# Patient Record
Sex: MALE | Race: WHITE | NOT HISPANIC OR LATINO | Employment: UNEMPLOYED | URBAN - NONMETROPOLITAN AREA
[De-identification: names, ages, dates, MRNs, and addresses within clinical notes are randomized per-mention and may not be internally consistent; named-entity substitution may affect disease eponyms.]

---

## 2018-09-25 ENCOUNTER — HISTORICAL (OUTPATIENT)
Dept: ADMINISTRATIVE | Facility: HOSPITAL | Age: 38
End: 2018-09-25

## 2023-01-06 ENCOUNTER — HISTORICAL (OUTPATIENT)
Dept: ADMINISTRATIVE | Facility: HOSPITAL | Age: 43
End: 2023-01-06

## 2023-02-24 ENCOUNTER — HOSPITAL ENCOUNTER (EMERGENCY)
Facility: HOSPITAL | Age: 43
Discharge: HOME | End: 2023-02-24
Payer: MEDICAID

## 2023-02-24 VITALS
HEIGHT: 72 IN | TEMPERATURE: 98 F | DIASTOLIC BLOOD PRESSURE: 88 MMHG | BODY MASS INDEX: 27.77 KG/M2 | SYSTOLIC BLOOD PRESSURE: 149 MMHG | OXYGEN SATURATION: 100 % | WEIGHT: 205 LBS | RESPIRATION RATE: 18 BRPM | HEART RATE: 93 BPM

## 2023-02-24 DIAGNOSIS — L03.116 CELLULITIS OF LEFT LEG: Primary | ICD-10-CM

## 2023-02-24 PROCEDURE — 96372 THER/PROPH/DIAG INJ SC/IM: CPT | Performed by: NURSE PRACTITIONER

## 2023-02-24 PROCEDURE — 99284 EMERGENCY DEPT VISIT MOD MDM: CPT

## 2023-02-24 PROCEDURE — 63600175 PHARM REV CODE 636 W HCPCS: Performed by: NURSE PRACTITIONER

## 2023-02-24 RX ORDER — MUPIROCIN 20 MG/G
OINTMENT TOPICAL 3 TIMES DAILY
Qty: 22 G | Refills: 0 | Status: SHIPPED | OUTPATIENT
Start: 2023-02-24

## 2023-02-24 RX ORDER — CEFTRIAXONE 1 G/1
1 INJECTION, POWDER, FOR SOLUTION INTRAMUSCULAR; INTRAVENOUS
Status: COMPLETED | OUTPATIENT
Start: 2023-02-24 | End: 2023-02-24

## 2023-02-24 RX ORDER — AMOXICILLIN AND CLAVULANATE POTASSIUM 875; 125 MG/1; MG/1
1 TABLET, FILM COATED ORAL 2 TIMES DAILY
Qty: 14 TABLET | Refills: 0 | Status: SHIPPED | OUTPATIENT
Start: 2023-02-24

## 2023-02-24 RX ORDER — CEPHALEXIN 500 MG/1
500 CAPSULE ORAL EVERY 6 HOURS
Qty: 28 CAPSULE | Refills: 0 | Status: SHIPPED | OUTPATIENT
Start: 2023-02-24 | End: 2023-02-24

## 2023-02-24 RX ADMIN — CEFTRIAXONE SODIUM 1 G: 1 INJECTION, POWDER, FOR SOLUTION INTRAMUSCULAR; INTRAVENOUS at 09:02

## 2023-02-25 NOTE — ED PROVIDER NOTES
Encounter Date: 2/24/2023       History     Chief Complaint   Patient presents with    Leg Pain     PT C/O LLE  PAIN AND SWELLING, DENIES INJURY, RECENT TATTOO TO REGION.      Recent tattoo on left lower leg  After getting tattoo a family pet dog was licking the site.      The history is provided by the patient. No  was used.   Review of patient's allergies indicates:   Allergen Reactions    Bactrim [sulfamethoxazole-trimethoprim]      History reviewed. No pertinent past medical history.  History reviewed. No pertinent surgical history.  History reviewed. No pertinent family history.  Social History     Tobacco Use    Smoking status: Every Day     Packs/day: 1.00     Types: Cigarettes    Smokeless tobacco: Never   Substance Use Topics    Alcohol use: Not Currently    Drug use: Not Currently     Review of Systems   Constitutional:  Negative for chills and fever.   Musculoskeletal:  Positive for myalgias.   Skin:  Positive for color change.   All other systems reviewed and are negative.    Physical Exam     Initial Vitals [02/24/23 2117]   BP Pulse Resp Temp SpO2   (!) 149/88 93 18 98.2 °F (36.8 °C) 100 %      MAP       --         Physical Exam    Nursing note and vitals reviewed.  Constitutional: He appears well-developed and well-nourished.   HENT:   Head: Normocephalic and atraumatic.   Eyes: EOM are normal. Pupils are equal, round, and reactive to light.   Cardiovascular:  Normal rate and regular rhythm.           Musculoskeletal:         General: Tenderness and edema present. Normal range of motion.      Comments: Left lower leg posterior tenderness, mild erythema distally, + mild swelling +2  Normal ROM  Healing mild flaking of tattoo, no open sores     Skin: Skin is warm and dry. Capillary refill takes less than 2 seconds. There is erythema.   Psychiatric: He has a normal mood and affect. Thought content normal.       ED Course   Procedures  Labs Reviewed - No data to display       Imaging  Results    None          Medications   cefTRIAXone injection 1 g (1 g Intramuscular Given 2/24/23 2136)     Medical Decision Making:   Initial Assessment:   cellulitis  Differential Diagnosis:   Abscess, tattoo site infection  ED Management:  Injection given here in ED tonight and patient to follow up with Primary care provider  Will return to ED if any new or worsening symptoms                        Clinical Impression:   Final diagnoses:  [L03.116] Cellulitis of left leg (Primary)        ED Disposition Condition    Discharge Stable          ED Prescriptions       Medication Sig Dispense Start Date End Date Auth. Provider    cephALEXin (KEFLEX) 500 MG capsule  (Status: Discontinued) Take 1 capsule (500 mg total) by mouth every 6 (six) hours. for 7 days 28 capsule 2/24/2023 2/24/2023 JEYSON Butler    amoxicillin-clavulanate 875-125mg (AUGMENTIN) 875-125 mg per tablet Take 1 tablet by mouth 2 (two) times daily. 14 tablet 2/24/2023 -- JEYSON Butler    mupirocin (BACTROBAN) 2 % ointment Apply topically 3 (three) times daily. 22 g 2/24/2023 -- JEYSON Butler          Follow-up Information    None          JEYSON Butler  02/24/23 2140

## 2023-05-19 ENCOUNTER — HOSPITAL ENCOUNTER (INPATIENT)
Facility: HOSPITAL | Age: 43
LOS: 3 days | Discharge: HOME | DRG: 345 | End: 2023-05-22
Admitting: FAMILY MEDICINE
Payer: MEDICAID

## 2023-05-19 DIAGNOSIS — K61.1 PERIRECTAL ABSCESS: Primary | ICD-10-CM

## 2023-05-19 LAB
ALBUMIN SERPL-MCNC: 3.9 G/DL (ref 3.4–5)
ALBUMIN/GLOB SERPL: 1.2 RATIO
ALP SERPL-CCNC: 86 UNIT/L (ref 50–144)
ALT SERPL-CCNC: 106 UNIT/L (ref 1–45)
ANION GAP SERPL CALC-SCNC: 6 MEQ/L (ref 2–13)
AST SERPL-CCNC: 53 UNIT/L (ref 17–59)
BASOPHILS # BLD AUTO: 0.03 X10(3)/MCL (ref 0.01–0.08)
BASOPHILS NFR BLD AUTO: 0.3 % (ref 0.1–1.2)
BILIRUBIN DIRECT+TOT PNL SERPL-MCNC: 0.5 MG/DL (ref 0–1)
BUN SERPL-MCNC: 13 MG/DL (ref 7–20)
CALCIUM SERPL-MCNC: 8.9 MG/DL (ref 8.4–10.2)
CHLORIDE SERPL-SCNC: 108 MMOL/L (ref 98–110)
CO2 SERPL-SCNC: 25 MMOL/L (ref 21–32)
CREAT SERPL-MCNC: 0.95 MG/DL (ref 0.66–1.25)
CREAT/UREA NIT SERPL: 14 (ref 12–20)
EOSINOPHIL # BLD AUTO: 0.2 X10(3)/MCL (ref 0.04–0.54)
EOSINOPHIL NFR BLD AUTO: 1.8 % (ref 0.7–7)
ERYTHROCYTE [DISTWIDTH] IN BLOOD BY AUTOMATED COUNT: 11.3 %
GFR SERPLBLD CREATININE-BSD FMLA CKD-EPI: >90 MLS/MIN/1.73/M2
GLOBULIN SER-MCNC: 3.2 GM/DL (ref 2–3.9)
GLUCOSE SERPL-MCNC: 108 MG/DL (ref 70–115)
HCT VFR BLD AUTO: 39.4 % (ref 36–52)
HGB BLD-MCNC: 13.8 G/DL (ref 13–18)
IMM GRANULOCYTES # BLD AUTO: 0.04 X10(3)/MCL (ref 0–0.03)
IMM GRANULOCYTES NFR BLD AUTO: 0.4 % (ref 0–0.5)
LACTATE SERPL-SCNC: 0.8 MMOL/L (ref 0.4–2)
LYMPHOCYTES # BLD AUTO: 1.18 X10(3)/MCL (ref 1.32–3.57)
LYMPHOCYTES NFR BLD AUTO: 10.9 % (ref 20–55)
MAGNESIUM SERPL-MCNC: 2 MG/DL (ref 1.8–2.4)
MCH RBC QN AUTO: 31.7 PG (ref 27–34)
MCHC RBC AUTO-ENTMCNC: 35 G/DL (ref 31–37)
MCV RBC AUTO: 90.6 FL (ref 79–99)
MONOCYTES # BLD AUTO: 1.16 X10(3)/MCL (ref 0.3–0.82)
MONOCYTES NFR BLD AUTO: 10.7 % (ref 4.7–12.5)
NEUTROPHILS # BLD AUTO: 8.26 X10(3)/MCL (ref 1.78–5.38)
NEUTROPHILS NFR BLD AUTO: 75.9 % (ref 37–73)
NRBC BLD AUTO-RTO: 0 %
PLATELET # BLD AUTO: 200 X10(3)/MCL (ref 140–371)
PMV BLD AUTO: 8.8 FL (ref 9.4–12.4)
POTASSIUM SERPL-SCNC: 3.6 MMOL/L (ref 3.5–5.1)
PROT SERPL-MCNC: 7.1 GM/DL (ref 6.3–8.2)
RBC # BLD AUTO: 4.35 X10(6)/MCL (ref 4–6)
SODIUM SERPL-SCNC: 139 MMOL/L (ref 135–145)
WBC # SPEC AUTO: 10.87 X10(3)/MCL (ref 4–11.5)

## 2023-05-19 PROCEDURE — 99285 EMERGENCY DEPT VISIT HI MDM: CPT | Mod: 25,27

## 2023-05-19 PROCEDURE — 25000003 PHARM REV CODE 250

## 2023-05-19 PROCEDURE — 87040 BLOOD CULTURE FOR BACTERIA: CPT

## 2023-05-19 PROCEDURE — 36415 COLL VENOUS BLD VENIPUNCTURE: CPT

## 2023-05-19 PROCEDURE — 83605 ASSAY OF LACTIC ACID: CPT

## 2023-05-19 PROCEDURE — 83735 ASSAY OF MAGNESIUM: CPT

## 2023-05-19 PROCEDURE — 11000001 HC ACUTE MED/SURG PRIVATE ROOM

## 2023-05-19 PROCEDURE — 63600175 PHARM REV CODE 636 W HCPCS

## 2023-05-19 PROCEDURE — 96374 THER/PROPH/DIAG INJ IV PUSH: CPT

## 2023-05-19 PROCEDURE — 85025 COMPLETE CBC W/AUTO DIFF WBC: CPT

## 2023-05-19 PROCEDURE — 96375 TX/PRO/DX INJ NEW DRUG ADDON: CPT

## 2023-05-19 PROCEDURE — 80053 COMPREHEN METABOLIC PANEL: CPT

## 2023-05-19 RX ORDER — KETOROLAC TROMETHAMINE 30 MG/ML
30 INJECTION, SOLUTION INTRAMUSCULAR; INTRAVENOUS
Status: COMPLETED | OUTPATIENT
Start: 2023-05-19 | End: 2023-05-19

## 2023-05-19 RX ADMIN — PIPERACILLIN AND TAZOBACTAM 4.5 G: 4; .5 INJECTION, POWDER, FOR SOLUTION INTRAVENOUS; PARENTERAL at 11:05

## 2023-05-19 RX ADMIN — KETOROLAC TROMETHAMINE 30 MG: 30 INJECTION, SOLUTION INTRAMUSCULAR; INTRAVENOUS at 11:05

## 2023-05-19 RX ADMIN — SODIUM CHLORIDE 1000 ML: 9 INJECTION, SOLUTION INTRAVENOUS at 11:05

## 2023-05-20 ENCOUNTER — ANESTHESIA (OUTPATIENT)
Dept: SURGERY | Facility: HOSPITAL | Age: 43
DRG: 345 | End: 2023-05-20
Payer: MEDICAID

## 2023-05-20 ENCOUNTER — ANESTHESIA EVENT (OUTPATIENT)
Dept: SURGERY | Facility: HOSPITAL | Age: 43
DRG: 345 | End: 2023-05-20
Payer: MEDICAID

## 2023-05-20 PROBLEM — R74.01 TRANSAMINITIS: Status: ACTIVE | Noted: 2023-05-20

## 2023-05-20 PROBLEM — F17.200 NICOTINE ADDICTION: Status: ACTIVE | Noted: 2023-05-20

## 2023-05-20 LAB
AMPHET UR QL SCN: ABNORMAL
BARBITURATE SCN PRESENT UR: NEGATIVE
BENZODIAZ UR QL SCN: NEGATIVE
CANNABINOIDS UR QL SCN: NEGATIVE
COCAINE UR QL SCN: NEGATIVE
INR BLD: 0.9
METHADONE UR QL SCN: NEGATIVE
OPIATES UR QL SCN: POSITIVE
PCP UR QL: NEGATIVE
PH UR: 6 [PH] (ref 3–11)
PROTHROMBIN TIME: 9.2 SECONDS (ref 9.3–11.9)

## 2023-05-20 PROCEDURE — G0378 HOSPITAL OBSERVATION PER HR: HCPCS

## 2023-05-20 PROCEDURE — 37000008 HC ANESTHESIA 1ST 15 MINUTES: Performed by: SURGERY

## 2023-05-20 PROCEDURE — 63600175 PHARM REV CODE 636 W HCPCS: Performed by: FAMILY MEDICINE

## 2023-05-20 PROCEDURE — 87205 SMEAR GRAM STAIN: CPT | Performed by: SURGERY

## 2023-05-20 PROCEDURE — 80307 DRUG TEST PRSMV CHEM ANLYZR: CPT

## 2023-05-20 PROCEDURE — 25500020 PHARM REV CODE 255: Performed by: FAMILY MEDICINE

## 2023-05-20 PROCEDURE — 63600175 PHARM REV CODE 636 W HCPCS: Performed by: NURSE ANESTHETIST, CERTIFIED REGISTERED

## 2023-05-20 PROCEDURE — 36000705 HC OR TIME LEV I EA ADD 15 MIN: Performed by: SURGERY

## 2023-05-20 PROCEDURE — D9220A PRA ANESTHESIA: Mod: ,,, | Performed by: NURSE ANESTHETIST, CERTIFIED REGISTERED

## 2023-05-20 PROCEDURE — 25000003 PHARM REV CODE 250: Performed by: FAMILY MEDICINE

## 2023-05-20 PROCEDURE — 63600175 PHARM REV CODE 636 W HCPCS: Performed by: INTERNAL MEDICINE

## 2023-05-20 PROCEDURE — 87075 CULTR BACTERIA EXCEPT BLOOD: CPT | Performed by: SURGERY

## 2023-05-20 PROCEDURE — 87070 CULTURE OTHR SPECIMN AEROBIC: CPT | Performed by: SURGERY

## 2023-05-20 PROCEDURE — 85610 PROTHROMBIN TIME: CPT | Performed by: INTERNAL MEDICINE

## 2023-05-20 PROCEDURE — 36415 COLL VENOUS BLD VENIPUNCTURE: CPT | Performed by: INTERNAL MEDICINE

## 2023-05-20 PROCEDURE — 63600175 PHARM REV CODE 636 W HCPCS

## 2023-05-20 PROCEDURE — D9220A PRA ANESTHESIA: ICD-10-PCS | Mod: ,,, | Performed by: NURSE ANESTHETIST, CERTIFIED REGISTERED

## 2023-05-20 PROCEDURE — 25000003 PHARM REV CODE 250

## 2023-05-20 PROCEDURE — 99900035 HC TECH TIME PER 15 MIN (STAT)

## 2023-05-20 PROCEDURE — 71000033 HC RECOVERY, INTIAL HOUR: Performed by: SURGERY

## 2023-05-20 PROCEDURE — 11000001 HC ACUTE MED/SURG PRIVATE ROOM

## 2023-05-20 PROCEDURE — 25000003 PHARM REV CODE 250: Performed by: NURSE ANESTHETIST, CERTIFIED REGISTERED

## 2023-05-20 PROCEDURE — 36000704 HC OR TIME LEV I 1ST 15 MIN: Performed by: SURGERY

## 2023-05-20 PROCEDURE — 37000009 HC ANESTHESIA EA ADD 15 MINS: Performed by: SURGERY

## 2023-05-20 PROCEDURE — 25000003 PHARM REV CODE 250: Performed by: INTERNAL MEDICINE

## 2023-05-20 PROCEDURE — 80074 ACUTE HEPATITIS PANEL: CPT | Performed by: INTERNAL MEDICINE

## 2023-05-20 PROCEDURE — 94761 N-INVAS EAR/PLS OXIMETRY MLT: CPT

## 2023-05-20 PROCEDURE — 25000003 PHARM REV CODE 250: Performed by: SURGERY

## 2023-05-20 PROCEDURE — S0030 INJECTION, METRONIDAZOLE: HCPCS | Performed by: INTERNAL MEDICINE

## 2023-05-20 PROCEDURE — S4991 NICOTINE PATCH NONLEGEND: HCPCS | Performed by: FAMILY MEDICINE

## 2023-05-20 RX ORDER — PROPOFOL 10 MG/ML
VIAL (ML) INTRAVENOUS
Status: DISCONTINUED | OUTPATIENT
Start: 2023-05-20 | End: 2023-05-20

## 2023-05-20 RX ORDER — METRONIDAZOLE 500 MG/100ML
500 INJECTION, SOLUTION INTRAVENOUS
Status: DISCONTINUED | OUTPATIENT
Start: 2023-05-20 | End: 2023-05-22 | Stop reason: HOSPADM

## 2023-05-20 RX ORDER — ONDANSETRON 2 MG/ML
4 INJECTION INTRAMUSCULAR; INTRAVENOUS EVERY 6 HOURS PRN
Status: DISCONTINUED | OUTPATIENT
Start: 2023-05-20 | End: 2023-05-20

## 2023-05-20 RX ORDER — ACETAMINOPHEN 325 MG/1
650 TABLET ORAL EVERY 6 HOURS PRN
Status: DISCONTINUED | OUTPATIENT
Start: 2023-05-20 | End: 2023-05-22 | Stop reason: HOSPADM

## 2023-05-20 RX ORDER — HYDROMORPHONE HYDROCHLORIDE 1 MG/ML
0.5 INJECTION, SOLUTION INTRAMUSCULAR; INTRAVENOUS; SUBCUTANEOUS EVERY 4 HOURS PRN
Status: DISCONTINUED | OUTPATIENT
Start: 2023-05-20 | End: 2023-05-20

## 2023-05-20 RX ORDER — SODIUM CHLORIDE 0.9 % (FLUSH) 0.9 %
10 SYRINGE (ML) INJECTION
Status: DISCONTINUED | OUTPATIENT
Start: 2023-05-20 | End: 2023-05-22 | Stop reason: HOSPADM

## 2023-05-20 RX ORDER — SODIUM CHLORIDE 9 MG/ML
INJECTION, SOLUTION INTRAVENOUS CONTINUOUS
Status: DISCONTINUED | OUTPATIENT
Start: 2023-05-20 | End: 2023-05-21

## 2023-05-20 RX ORDER — HYDROMORPHONE HYDROCHLORIDE 1 MG/ML
1 INJECTION, SOLUTION INTRAMUSCULAR; INTRAVENOUS; SUBCUTANEOUS EVERY 4 HOURS PRN
Status: DISCONTINUED | OUTPATIENT
Start: 2023-05-20 | End: 2023-05-20

## 2023-05-20 RX ORDER — HYDROMORPHONE HYDROCHLORIDE 1 MG/ML
0.5 INJECTION, SOLUTION INTRAMUSCULAR; INTRAVENOUS; SUBCUTANEOUS EVERY 6 HOURS PRN
Status: DISCONTINUED | OUTPATIENT
Start: 2023-05-20 | End: 2023-05-21 | Stop reason: SDUPTHER

## 2023-05-20 RX ORDER — MORPHINE SULFATE 2 MG/ML
2 INJECTION, SOLUTION INTRAMUSCULAR; INTRAVENOUS EVERY 6 HOURS PRN
Status: DISCONTINUED | OUTPATIENT
Start: 2023-05-20 | End: 2023-05-22 | Stop reason: HOSPADM

## 2023-05-20 RX ORDER — DEXTROSE MONOHYDRATE AND SODIUM CHLORIDE 5; .9 G/100ML; G/100ML
INJECTION, SOLUTION INTRAVENOUS CONTINUOUS
Status: DISCONTINUED | OUTPATIENT
Start: 2023-05-20 | End: 2023-05-20

## 2023-05-20 RX ORDER — DEXTROSE MONOHYDRATE AND SODIUM CHLORIDE 5; .9 G/100ML; G/100ML
INJECTION, SOLUTION INTRAVENOUS CONTINUOUS
Status: DISCONTINUED | OUTPATIENT
Start: 2023-05-20 | End: 2023-05-20 | Stop reason: SDUPTHER

## 2023-05-20 RX ORDER — HYDROCODONE BITARTRATE AND ACETAMINOPHEN 5; 325 MG/1; MG/1
1 TABLET ORAL EVERY 6 HOURS PRN
Status: DISCONTINUED | OUTPATIENT
Start: 2023-05-20 | End: 2023-05-20 | Stop reason: SDUPTHER

## 2023-05-20 RX ORDER — HYDROCODONE BITARTRATE AND ACETAMINOPHEN 5; 325 MG/1; MG/1
1 TABLET ORAL EVERY 4 HOURS PRN
Status: DISCONTINUED | OUTPATIENT
Start: 2023-05-20 | End: 2023-05-22 | Stop reason: HOSPADM

## 2023-05-20 RX ORDER — ONDANSETRON 2 MG/ML
4 INJECTION INTRAMUSCULAR; INTRAVENOUS EVERY 8 HOURS PRN
Status: DISCONTINUED | OUTPATIENT
Start: 2023-05-20 | End: 2023-05-20 | Stop reason: SDUPTHER

## 2023-05-20 RX ORDER — FENTANYL CITRATE 50 UG/ML
INJECTION, SOLUTION INTRAMUSCULAR; INTRAVENOUS
Status: DISCONTINUED | OUTPATIENT
Start: 2023-05-20 | End: 2023-05-20

## 2023-05-20 RX ORDER — ONDANSETRON 2 MG/ML
4 INJECTION INTRAMUSCULAR; INTRAVENOUS EVERY 12 HOURS PRN
Status: DISCONTINUED | OUTPATIENT
Start: 2023-05-20 | End: 2023-05-22 | Stop reason: HOSPADM

## 2023-05-20 RX ORDER — IBUPROFEN 200 MG
1 TABLET ORAL DAILY
Status: DISCONTINUED | OUTPATIENT
Start: 2023-05-20 | End: 2023-05-22 | Stop reason: HOSPADM

## 2023-05-20 RX ORDER — BUPIVACAINE HYDROCHLORIDE 5 MG/ML
INJECTION, SOLUTION EPIDURAL; INTRACAUDAL
Status: DISCONTINUED | OUTPATIENT
Start: 2023-05-20 | End: 2023-05-20 | Stop reason: HOSPADM

## 2023-05-20 RX ORDER — MUPIROCIN 20 MG/G
1 OINTMENT TOPICAL 2 TIMES DAILY
Status: DISCONTINUED | OUTPATIENT
Start: 2023-05-20 | End: 2023-05-22 | Stop reason: HOSPADM

## 2023-05-20 RX ORDER — KETOROLAC TROMETHAMINE 30 MG/ML
INJECTION, SOLUTION INTRAMUSCULAR; INTRAVENOUS
Status: DISCONTINUED | OUTPATIENT
Start: 2023-05-20 | End: 2023-05-20

## 2023-05-20 RX ORDER — ACETAMINOPHEN 325 MG/1
650 TABLET ORAL EVERY 6 HOURS PRN
Status: DISCONTINUED | OUTPATIENT
Start: 2023-05-20 | End: 2023-05-20

## 2023-05-20 RX ORDER — GLYCOPYRROLATE 0.2 MG/ML
INJECTION INTRAMUSCULAR; INTRAVENOUS
Status: DISCONTINUED | OUTPATIENT
Start: 2023-05-20 | End: 2023-05-20

## 2023-05-20 RX ORDER — MIDAZOLAM HYDROCHLORIDE 1 MG/ML
INJECTION INTRAMUSCULAR; INTRAVENOUS
Status: DISCONTINUED | OUTPATIENT
Start: 2023-05-20 | End: 2023-05-20

## 2023-05-20 RX ORDER — LIDOCAINE HYDROCHLORIDE 10 MG/ML
INJECTION, SOLUTION EPIDURAL; INFILTRATION; INTRACAUDAL; PERINEURAL
Status: DISCONTINUED | OUTPATIENT
Start: 2023-05-20 | End: 2023-05-20 | Stop reason: HOSPADM

## 2023-05-20 RX ADMIN — DEXTROSE AND SODIUM CHLORIDE: 5; 900 INJECTION, SOLUTION INTRAVENOUS at 02:05

## 2023-05-20 RX ADMIN — DEXTROSE AND SODIUM CHLORIDE: 5; 900 INJECTION, SOLUTION INTRAVENOUS at 06:05

## 2023-05-20 RX ADMIN — CEFEPIME 1 G: 1 INJECTION, POWDER, FOR SOLUTION INTRAMUSCULAR; INTRAVENOUS at 03:05

## 2023-05-20 RX ADMIN — NICOTINE 1 PATCH: 21 PATCH, EXTENDED RELEASE TRANSDERMAL at 03:05

## 2023-05-20 RX ADMIN — IOPAMIDOL 100 ML: 755 INJECTION, SOLUTION INTRAVENOUS at 03:05

## 2023-05-20 RX ADMIN — FENTANYL CITRATE 100 MCG: 50 INJECTION, SOLUTION INTRAMUSCULAR; INTRAVENOUS at 10:05

## 2023-05-20 RX ADMIN — VANCOMYCIN HYDROCHLORIDE 2000 MG: 1 INJECTION, POWDER, LYOPHILIZED, FOR SOLUTION INTRAVENOUS at 12:05

## 2023-05-20 RX ADMIN — SODIUM CHLORIDE: 9 INJECTION, SOLUTION INTRAVENOUS at 11:05

## 2023-05-20 RX ADMIN — FENTANYL CITRATE 50 MCG: 50 INJECTION, SOLUTION INTRAMUSCULAR; INTRAVENOUS at 10:05

## 2023-05-20 RX ADMIN — PROPOFOL 100 MG: 10 INJECTION, EMULSION INTRAVENOUS at 10:05

## 2023-05-20 RX ADMIN — GLYCOPYRROLATE 0.2 MG: 0.2 INJECTION INTRAMUSCULAR; INTRAVENOUS at 10:05

## 2023-05-20 RX ADMIN — CEFEPIME 1 G: 1 INJECTION, POWDER, FOR SOLUTION INTRAMUSCULAR; INTRAVENOUS at 06:05

## 2023-05-20 RX ADMIN — KETOROLAC TROMETHAMINE 30 MG: 30 INJECTION, SOLUTION INTRAMUSCULAR; INTRAVENOUS at 10:05

## 2023-05-20 RX ADMIN — DEXTROSE AND SODIUM CHLORIDE: 5; 900 INJECTION, SOLUTION INTRAVENOUS at 04:05

## 2023-05-20 RX ADMIN — MUPIROCIN 1 G: 20 OINTMENT TOPICAL at 11:05

## 2023-05-20 RX ADMIN — MIDAZOLAM HYDROCHLORIDE 2 MG: 1 INJECTION, SOLUTION INTRAMUSCULAR; INTRAVENOUS at 10:05

## 2023-05-20 RX ADMIN — CEFEPIME 1 G: 1 INJECTION, POWDER, FOR SOLUTION INTRAMUSCULAR; INTRAVENOUS at 10:05

## 2023-05-20 RX ADMIN — VANCOMYCIN HYDROCHLORIDE 1750 MG: 1 INJECTION, POWDER, LYOPHILIZED, FOR SOLUTION INTRAVENOUS at 12:05

## 2023-05-20 RX ADMIN — METRONIDAZOLE 500 MG: 5 INJECTION, SOLUTION INTRAVENOUS at 10:05

## 2023-05-20 RX ADMIN — METRONIDAZOLE 500 MG: 5 INJECTION, SOLUTION INTRAVENOUS at 06:05

## 2023-05-20 RX ADMIN — METRONIDAZOLE 500 MG: 5 INJECTION, SOLUTION INTRAVENOUS at 03:05

## 2023-05-20 NOTE — ASSESSMENT & PLAN NOTE
Dangers of cigarette smoking were reviewed with patient in detail. Patient was Counseled for 3-10 minutes. Nicotine replacement options were discussed. Nicotine replacement was discussed- prescribed

## 2023-05-20 NOTE — ED PROVIDER NOTES
Encounter Date: 5/19/2023       History     Chief Complaint   Patient presents with    Abscess     On R buttock. States they wanted to admit him him earlier today but had to care care of things at home first.     42-year-old male returns to the emergency room for admission.  He was offered admission earlier today for a perirectal abscess.  He declined admission, and returns at this time.  He has painful swelling in his perineum, into the right medial buttock for the past 3 days.  Denies any fever or chills.  He is suffered with cellulitis in the past.  He reports substance abuse, using both methamphetamines and heroin.  He is not a diabetic.    The history is provided by the patient.   Review of patient's allergies indicates:   Allergen Reactions    Bactrim [sulfamethoxazole-trimethoprim]      No past medical history on file.  Past Surgical History:   Procedure Laterality Date    LEG SURGERY Right      No family history on file.  Social History     Tobacco Use    Smoking status: Every Day     Packs/day: 1.00     Types: Cigarettes    Smokeless tobacco: Never   Substance Use Topics    Alcohol use: Not Currently    Drug use: Not Currently     Review of Systems   Skin:         Swelling and pain in the perineum and right buttock.     Physical Exam     Initial Vitals [05/19/23 2302]   BP Pulse Resp Temp SpO2   131/79 93 16 -- 98 %      MAP       --         Physical Exam    Nursing note and vitals reviewed.  Constitutional: Vital signs are normal. He appears well-developed and well-nourished. He is cooperative.   HENT:   Head: Normocephalic and atraumatic.   Eyes: Conjunctivae, EOM and lids are normal. Pupils are equal, round, and reactive to light.   Neck: Trachea normal. Neck supple.   Normal range of motion.  Cardiovascular:  Normal rate, regular rhythm, normal heart sounds and intact distal pulses.           Pulmonary/Chest: Breath sounds normal.   Abdominal: Abdomen is soft. Bowel sounds are normal.   Musculoskeletal:          General: Normal range of motion.      Cervical back: Normal, normal range of motion and neck supple.      Lumbar back: Normal.     Neurological: He is alert and oriented to person, place, and time. He has normal strength. Coordination normal.   Skin: Skin is warm, dry and intact. Capillary refill takes less than 2 seconds. Abscess noted. There is erythema.   There is a perirectal abscess noted on the medial right buttock, extending into the perineum.  It is quite indurated and tender, with no fluctuance.   Psychiatric: He has a normal mood and affect. His speech is normal and behavior is normal. Judgment and thought content normal. Cognition and memory are normal.       ED Course   Procedures  Labs Reviewed   CBC WITH DIFFERENTIAL - Abnormal; Notable for the following components:       Result Value    MPV 8.8 (*)     Neut % 75.9 (*)     Lymph % 10.9 (*)     Lymph # 1.18 (*)     Neut # 8.26 (*)     Mono # 1.16 (*)     IG# 0.04 (*)     All other components within normal limits   BLOOD CULTURE [facility]   BLOOD CULTURE [facility]   CBC W/ AUTO DIFFERENTIAL    Narrative:     The following orders were created for panel order CBC auto differential.  Procedure                               Abnormality         Status                     ---------                               -----------         ------                     CBC with Differential[[id number]]        Abnormal            Final result                 Please view results for these tests on the individual orders.   COMPREHENSIVE METABOLIC PANEL   LACTIC ACID, PLASMA   MAGNESIUM   DRUG SCREEN, URINE (BEAKER)          Imaging Results    None          Medications   sodium chloride 0.9% bolus 1,000 mL 1,000 mL (1,000 mLs Intravenous New Bag 5/19/23 8610)   vancomycin - pharmacy to dose (has no administration in time range)   vancomycin (VANCOCIN) 2,000 mg in dextrose 5 % (D5W) 500 mL IVPB (has no administration in time range)   piperacillin-tazobactam (ZOSYN) 4.5 g in  dextrose 5 % in water (D5W) 5 % 100 mL IVPB (MB+) (4.5 g Intravenous New Bag 5/19/23 2321)   ketorolac injection 30 mg (30 mg Intravenous Given 5/19/23 2321)     Medical Decision Making:   Initial Assessment:   Perirectal abscess  ED Management:  Zosyn, vancomycin  Rule out sepsis  Admit for surgical evaluation in the morning  Patient does not meet sepsis criteria.                        Clinical Impression:   Final diagnoses:  [K61.1] Perirectal abscess (Primary)        ED Disposition Condition    Observation Good                Hugo Corea MD  05/19/23 4565

## 2023-05-20 NOTE — HOSPITAL COURSE
05/20/2023 pt admitted with large jeanette rectal abscess, awaiting surgery consult this am.  05/21/2023 patient is status post I&D of perirectal abscess.  On IV antibiotics.  We will have dressing change today.  05/22/2023 brief discharge summary:  Young patient presented with perirectal pain in the emergency room was found to have large perirectal abscess.  Hospital course patient was admitted to the hospital given IV antibiotics patient underwent I and D with Dr. Medrano.  He is now getting dressing changes he is afebrile stable can be discharged home.  We will send him home on Cleocin 300 mg 4 times a day for 7 days.  He will follow with Dr. Medrano.   is arranging for wound care to be done here on [address] on a daily basis.

## 2023-05-20 NOTE — HPI
Patient is a 42 y.o. male with a medical history of nicotine addiction and polysubstance abuse including methamphetamine and heroin presents to the ER on account of rectal pain, swelling and redness over the past 3 days.    Patient has been at his usual state of health until about 3 days ago when he developed rectal pain, pain has been persistent, about 8/10 in intensity, radiating to involve the whole of the rectal area, no aggravating or relieving factor.  This has been associated with swelling and redness.  There was also yellowish discharge.  He also complains of fever.  He decided to come to the ER earlier yesterday for which she was evaluated and diagnosis of likely perirectal abscess was made and patient was supposed to undergo drainage.  In left the ER saying that he would come back.  He later came back last night for further evaluation.  Plan was to consult general surgeon for the purpose of incision and drainage later this morning as per my discussion with the ER attending.

## 2023-05-20 NOTE — PROGRESS NOTES
[facility]/Surg  [facility] Medicine  Progress Note    Patient Name: Omar Rodriguez  MRN: [id number]  Patient Class: OP- Observation   Admission Date: 5/19/2023  Length of Stay: 0 days  Attending Physician: Denise Rodriguez MD  Primary Care Provider: Primary Doctor No        Subjective:     Principal Problem:Perirectal abscess        HPI:  Patient is a 42 y.o. male with a medical history of nicotine addiction and polysubstance abuse including methamphetamine and heroin presents to the ER on account of rectal pain, swelling and redness over the past 3 days.    Patient has been at his usual state of health until about 3 days ago when he developed rectal pain, pain has been persistent, about 8/10 in intensity, radiating to involve the whole of the rectal area, no aggravating or relieving factor.  This has been associated with swelling and redness.  There was also yellowish discharge.  He also complains of fever.  He decided to come to the ER earlier yesterday for which she was evaluated and diagnosis of likely perirectal abscess was made and patient was supposed to undergo drainage.  In left the ER saying that he would come back.  He later came back last night for further evaluation.  Plan was to consult general surgeon for the purpose of incision and drainage later this morning as per my discussion with the ER attending.      Overview/Hospital Course:  05/20/2023 pt admitted with large jeanette rectal abscess, awaiting surgery consult this am.      Interval History:       Review of Systems   Constitutional:  Negative for appetite change, fatigue and fever.   Respiratory:  Negative for cough, shortness of breath and wheezing.    Cardiovascular:  Negative for chest pain and leg swelling.   Gastrointestinal:  Negative for abdominal distention, abdominal pain, constipation, diarrhea, nausea and vomiting.   Skin:  Positive for rash and wound. Negative for color change and pallor.   Neurological:  Negative for  tremors, syncope and headaches.   Psychiatric/Behavioral:  Negative for agitation and behavioral problems.    Objective:     Vital Signs (Most Recent):  Temp: 98 °F (36.7 °C) (05/20/23 1029)  Pulse: 75 (05/20/23 1029)  Resp: 20 (05/20/23 1029)  BP: 116/82 (05/20/23 1029)  SpO2: 98 % (05/20/23 1029) Vital Signs (24h Range):  Temp:  [97.2 °F (36.2 °C)-98 °F (36.7 °C)] 98 °F (36.7 °C)  Pulse:  [75-93] 75  Resp:  [16-20] 20  SpO2:  [97 %-100 %] 98 %  BP: (114-131)/(79-89) 116/82     Weight: 97.7 kg (215 lb 4.8 oz)  Body mass index is 29.2 kg/m².    Intake/Output Summary (Last 24 hours) at 5/20/2023 1413  Last data filed at 5/20/2023 1330  Gross per 24 hour   Intake 761.25 ml   Output 1050 ml   Net -288.75 ml         Physical Exam  Constitutional:       General: He is not in acute distress.     Appearance: Normal appearance. He is not ill-appearing, toxic-appearing or diaphoretic.   HENT:      Head: Normocephalic and atraumatic.      Nose: Nose normal. No congestion or rhinorrhea.      Mouth/Throat:      Mouth: Mucous membranes are moist.      Pharynx: Oropharynx is clear.   Eyes:      General: No scleral icterus.     Conjunctiva/sclera: Conjunctivae normal.   Neck:      Vascular: No carotid bruit.   Cardiovascular:      Rate and Rhythm: Normal rate and regular rhythm.      Pulses: Normal pulses.      Heart sounds: Normal heart sounds. No murmur heard.  Pulmonary:      Effort: Pulmonary effort is normal. No respiratory distress.      Breath sounds: Normal breath sounds. No wheezing, rhonchi or rales.   Abdominal:      General: Abdomen is flat. Bowel sounds are normal. There is no distension.      Palpations: Abdomen is soft.      Tenderness: There is no abdominal tenderness. There is no guarding.   Musculoskeletal:         General: No swelling or tenderness. Normal range of motion.      Cervical back: Normal range of motion and neck supple. No tenderness.      Right lower leg: No edema.      Left lower leg: No edema.    Lymphadenopathy:      Cervical: No cervical adenopathy.   Skin:     General: Skin is warm and dry.      Coloration: Skin is not jaundiced or pale.      Findings: Lesion and rash present.      Comments: Large boil on right buttocks near rectum, very red and tender, swollen, surrounding cellulitis   Neurological:      General: No focal deficit present.      Mental Status: He is alert and oriented to person, place, and time. Mental status is at baseline.      Cranial Nerves: No cranial nerve deficit.      Motor: No weakness.      Coordination: Coordination normal.      Gait: Gait normal.   Psychiatric:         Mood and Affect: Mood normal.         Behavior: Behavior normal.         Thought Content: Thought content normal.         Judgment: Judgment normal.           Significant Labs: All pertinent labs within the past 24 hours have been reviewed.  CBC:   Recent Labs   Lab 05/19/23  2329   WBC 10.87   HGB 13.8   HCT 39.4        CMP:   Recent Labs   Lab 05/19/23 2329      K 3.6   CO2 25   BUN 13.0   CREATININE 0.95   CALCIUM 8.9   ALBUMIN 3.9   BILITOT 0.5   ALKPHOS 86   AST 53   *       Significant Imaging: I have reviewed all pertinent imaging results/findings within the past 24 hours.      Assessment/Plan:      * Perirectal abscess  Continue iv abx  Surgery consulted      Transaminitis  High LFTS  Hepatitis panel sent      Nicotine addiction  Dangers of cigarette smoking were reviewed with patient in detail. Patient was Counseled for 3-10 minutes. Nicotine replacement options were discussed. Nicotine replacement was discussed- prescribed      VTE Risk Mitigation (From admission, onward)         Ordered     IP VTE LOW RISK PATIENT  Once         05/20/23 0333     Place sequential compression device  Until discontinued         05/20/23 0333                Discharge Planning   AMANDEEP: 5/22/2023     Code Status: Full Code   Is the patient medically ready for discharge?:     Reason for patient still in  hospital (select all that apply): Patient trending condition, Treatment and Consult recommendations                     Denise Rodriguez MD  Department of Hospital Medicine   [facility]

## 2023-05-20 NOTE — SUBJECTIVE & OBJECTIVE
Interval History:       Review of Systems   Constitutional:  Negative for appetite change, fatigue and fever.   Respiratory:  Negative for cough, shortness of breath and wheezing.    Cardiovascular:  Negative for chest pain and leg swelling.   Gastrointestinal:  Negative for abdominal distention, abdominal pain, constipation, diarrhea, nausea and vomiting.   Skin:  Positive for rash and wound. Negative for color change and pallor.   Neurological:  Negative for tremors, syncope and headaches.   Psychiatric/Behavioral:  Negative for agitation and behavioral problems.    Objective:     Vital Signs (Most Recent):  Temp: 98 °F (36.7 °C) (05/20/23 1029)  Pulse: 75 (05/20/23 1029)  Resp: 20 (05/20/23 1029)  BP: 116/82 (05/20/23 1029)  SpO2: 98 % (05/20/23 1029) Vital Signs (24h Range):  Temp:  [97.2 °F (36.2 °C)-98 °F (36.7 °C)] 98 °F (36.7 °C)  Pulse:  [75-93] 75  Resp:  [16-20] 20  SpO2:  [97 %-100 %] 98 %  BP: (114-131)/(79-89) 116/82     Weight: 97.7 kg (215 lb 4.8 oz)  Body mass index is 29.2 kg/m².    Intake/Output Summary (Last 24 hours) at 5/20/2023 1413  Last data filed at 5/20/2023 1330  Gross per 24 hour   Intake 761.25 ml   Output 1050 ml   Net -288.75 ml         Physical Exam  Constitutional:       General: He is not in acute distress.     Appearance: Normal appearance. He is not ill-appearing, toxic-appearing or diaphoretic.   HENT:      Head: Normocephalic and atraumatic.      Nose: Nose normal. No congestion or rhinorrhea.      Mouth/Throat:      Mouth: Mucous membranes are moist.      Pharynx: Oropharynx is clear.   Eyes:      General: No scleral icterus.     Conjunctiva/sclera: Conjunctivae normal.   Neck:      Vascular: No carotid bruit.   Cardiovascular:      Rate and Rhythm: Normal rate and regular rhythm.      Pulses: Normal pulses.      Heart sounds: Normal heart sounds. No murmur heard.  Pulmonary:      Effort: Pulmonary effort is normal. No respiratory distress.      Breath sounds: Normal breath  sounds. No wheezing, rhonchi or rales.   Abdominal:      General: Abdomen is flat. Bowel sounds are normal. There is no distension.      Palpations: Abdomen is soft.      Tenderness: There is no abdominal tenderness. There is no guarding.   Musculoskeletal:         General: No swelling or tenderness. Normal range of motion.      Cervical back: Normal range of motion and neck supple. No tenderness.      Right lower leg: No edema.      Left lower leg: No edema.   Lymphadenopathy:      Cervical: No cervical adenopathy.   Skin:     General: Skin is warm and dry.      Coloration: Skin is not jaundiced or pale.      Findings: Lesion and rash present.      Comments: Large boil on right buttocks near rectum, very red and tender, swollen, surrounding cellulitis   Neurological:      General: No focal deficit present.      Mental Status: He is alert and oriented to person, place, and time. Mental status is at baseline.      Cranial Nerves: No cranial nerve deficit.      Motor: No weakness.      Coordination: Coordination normal.      Gait: Gait normal.   Psychiatric:         Mood and Affect: Mood normal.         Behavior: Behavior normal.         Thought Content: Thought content normal.         Judgment: Judgment normal.           Significant Labs: All pertinent labs within the past 24 hours have been reviewed.  CBC:   Recent Labs   Lab 05/19/23  2329   WBC 10.87   HGB 13.8   HCT 39.4        CMP:   Recent Labs   Lab 05/19/23  2329      K 3.6   CO2 25   BUN 13.0   CREATININE 0.95   CALCIUM 8.9   ALBUMIN 3.9   BILITOT 0.5   ALKPHOS 86   AST 53   *       Significant Imaging: I have reviewed all pertinent imaging results/findings within the past 24 hours.

## 2023-05-20 NOTE — H&P
Chief Complaint; Rectal pain, swelling and redness over the past 3 days    HPI:   Patient is a 42 y.o. male with a medical history of nicotine addiction and polysubstance abuse including methamphetamine and heroin presents to the ER on account of rectal pain, swelling and redness over the past 3 days.    Patient has been at his usual state of health until about 3 days ago when he developed rectal pain, pain has been persistent, about 8/10 in intensity, radiating to involve the whole of the rectal area, no aggravating or relieving factor.  This has been associated with swelling and redness.  There was also yellowish discharge.  He also complains of fever.  He decided to come to the ER earlier yesterday for which she was evaluated and diagnosis of likely perirectal abscess was made and patient was supposed to undergo drainage.  In left the ER saying that he would come back.  He later came back last night for further evaluation.  Plan was to consult general surgeon for the purpose of incision and drainage later this morning as per my discussion with the ER attending.    PCP Primary Doctor No MD        No past medical history on file.  Denies history of hypertension or diabetes.    Past Surgical History:   Procedure Laterality Date    LEG SURGERY Right         (Not in a hospital admission)  Not on any home medications    Review of patient's allergies indicates:   Allergen Reactions    Bactrim [sulfamethoxazole-trimethoprim]         Social History     Tobacco Use    Smoking status: Every Day     Packs/day: 1.00     Types: Cigarettes    Smokeless tobacco: Never   Substance Use Topics    Alcohol use: Not Currently    Admits to history of cigarette smoking, methamphetamine and heroin use.  Denies history of alcohol abuse.    No family history on file.  Significant family history of hypertension    Review of Systems  Review of Systems   Constitutional: Negative for fever.   HEENT: Negative for drooling, ear pain, facial  swelling and nosebleeds.    Eyes: Negative for discharge and visual disturbance.   Respiratory: Negative for cough, negative shortness of breath.    Cardiovascular: negative for chest pain or SOB  Gastrointestinal: Negative for anal bleeding and rectal pain. Negative Nausea or Vomiting.  Genitourinary; positive for rectal pain, swelling and redness and discharge   Musculoskeletal: Negative for neck pain.   Skin: Negative for rash.   Neurological: negative for numbness, negative for weakness,negative for seizures and facial asymmetry.              Objective:     No intake/output data recorded.    /79   Pulse 93   Resp 16   Ht 6' (1.829 m)   Wt 97.5 kg (215 lb)   SpO2 98%   BMI 29.16 kg/m²     General Appearance:    Awake, alert, not in acute distress   HEENT:   atraumatic, PERRL, EOM intact, conjuctiva pink, sclera anicteric, oropharynx moist, no lesions noted   Neck:    Supple, no JVD, no carotid bruits, no lymphadenopathy or thyromegaly noted       Pulmonary:   Clear to auscultation bilaterally, reduced breath sounds bileterally.   Cardiovascular:    Regular rate and rhythm, S1 S2 normal   Abdomen:     Soft, non-tender,nondistended, bowel sounds active all four quadrants, no masses, no organomegaly   Extremities:  no edema, no cyanosis or clubbing noted       Skin:   No bruises noted.    RECTAL; obvious redness of the rectal area, swelling, tenderness, with yellowish discharge.       Neurologic: Alert, awake, oriented x 3, moves all extremities.                 Data Review :   Labs:    CBC:   Lab Results   Component Value Date    WBC 10.87 05/19/2023    RBC 4.35 05/19/2023    HGB 13.8 05/19/2023    HCT 39.4 05/19/2023     05/19/2023     CMP:   Lab Results   Component Value Date     05/19/2023    K 3.6 05/19/2023    CO2 25 05/19/2023    BUN 13.0 05/19/2023    CREATININE 0.95 05/19/2023    CALCIUM 8.9 05/19/2023    ALKPHOS 86 05/19/2023    AST 53 05/19/2023     (H) 05/19/2023     ALBUMIN 3.9 05/19/2023    BILITOT 0.5 05/19/2023     Cardiac markers: No results found for: BNP, CKMB, CKTOTAL, TROPONIN, MYOGLOBIN    Radiology:  Micro:  No components found for: BLOODCX, SPUTUMCX, URINECX    Radiology:  [unfilled]        Assessment & Plan:   1. Likely perirectal abscess; unclear precipitating factor, we will obtain a CT of the pelvis area for further evaluation.  Start patient on IV cefepime, Flagyl and continue IV vancomycin.  Follow up the culture results.  General surgeon consulted for the purpose of incision and drainage in the morning as per my discussion with the ER attending.  Continue analgesics.    2. Nicotine addiction with polysubstance abuse; including methamphetamine heroin, patient has been counseled on need to quit cigarette smoking and to avoid illicit drug use.  He promised to quit nicotine addiction and illicit drug use.    3. Liver dysfunction; ALT was found to be significantly elevated, we will obtain acute hepatitis panel.  Avoid hepatotoxic agents.    4. Maintain the patient on DVT prophylaxis by way of SCD    Disposition; for incision and drainage in the morning.  Patient is kept NPO at this time.  This note was completed using voice recognition software and transcription errors may occur.    This Encounter was via Telemedicine (Both audio and visual ) and from [place], [place].  Patient was located in [place].    Evaluation  of the patient was done alongside the patient's nursing staff     Patient will be placed on observation status.    Chelo Rivers  5/20/2023  2:16 AM

## 2023-05-20 NOTE — PROGRESS NOTES
Pharmacokinetic Initial Assessment: IV Vancomycin    Assessment/Plan:    Initiate intravenous vancomycin with loading dose of 2000 mg once followed by a maintenance dose of vancomycin 1750mg IV every 12 hours  Desired empiric serum trough concentration is 10 to 15 mcg/mL  Draw vancomycin trough level 60 min prior to fourth dose on 5/21 at approximately 1130  Pharmacy will continue to follow and monitor vancomycin.      Please contact pharmacy with any questions regarding this assessment.     Thank you for the consult,   Denise Polk       Patient brief summary:  Omar Rodriguez is a 42 y.o. male initiated on antimicrobial therapy with IV Vancomycin for treatment of suspected skin & soft tissue infection    Drug Allergies:   Review of patient's allergies indicates:   Allergen Reactions    Bactrim [sulfamethoxazole-trimethoprim]        Actual Body Weight:   97.5kg    Renal Function:   Estimated Creatinine Clearance: 122.6 mL/min (based on SCr of 0.95 mg/dL).,     Dialysis Method (if applicable):  N/A    CBC (last 72 hours):  Recent Labs   Lab Result Units 05/19/23  2329   WBC x10(3)/mcL 10.87   Hgb g/dL 13.8   Hct % 39.4   Platelet x10(3)/mcL 200   Mono % % 10.7   Eos % % 1.8   Basophil % % 0.3       Metabolic Panel (last 72 hours):  Recent Labs   Lab Result Units 05/19/23  2329   Sodium Level mmol/L 139   Potassium Level mmol/L 3.6   Chloride mmol/L 108   Carbon Dioxide mmol/L 25   Glucose Level mg/dL 108   Blood Urea Nitrogen mg/dL 13.0   Creatinine mg/dL 0.95   Albumin Level g/dL 3.9   Bilirubin Total mg/dL 0.5   Alkaline Phosphatase unit/L 86   Aspartate Aminotransferase unit/L 53   Alanine Aminotransferase unit/L 106*   Magnesium Level mg/dL 2.00       Drug levels (last 3 results):  No results for input(s): VANCOMYCINRA, VANCORANDOM, VANCOMYCINPE, VANCOPEAK, VANCOMYCINTR, VANCOTROUGH in the last 72 hours.    Microbiologic Results:  Microbiology Results (last 7 days)       Procedure Component Value Units Date/Time     Blood Culture x two cultures. Draw prior to antibiotics [[id number]] Collected: 05/19/23 2329    Order Status: Sent Specimen: Blood from Antecubital, Left Updated: 05/19/23 2340    Blood Culture x two cultures. Draw prior to antibiotics [[id number]] Collected: 05/19/23 2335    Order Status: Sent Specimen: Blood from Antecubital, Left Updated: 05/19/23 2340

## 2023-05-21 LAB
ALBUMIN SERPL-MCNC: 3.2 G/DL (ref 3.4–5)
ALBUMIN/GLOB SERPL: 1.1 RATIO
ALP SERPL-CCNC: 62 UNIT/L (ref 50–144)
ALT SERPL-CCNC: 112 UNIT/L (ref 1–45)
ANION GAP SERPL CALC-SCNC: 5 MEQ/L (ref 2–13)
AST SERPL-CCNC: 62 UNIT/L (ref 17–59)
BILIRUBIN DIRECT+TOT PNL SERPL-MCNC: 0.1 MG/DL (ref 0–0.3)
BILIRUBIN DIRECT+TOT PNL SERPL-MCNC: 0.4 MG/DL (ref 0–1)
BUN SERPL-MCNC: 9 MG/DL (ref 7–20)
CALCIUM SERPL-MCNC: 8.2 MG/DL (ref 8.4–10.2)
CHLORIDE SERPL-SCNC: 104 MMOL/L (ref 98–110)
CHOLEST SERPL-MCNC: 73 MG/DL (ref 0–200)
CO2 SERPL-SCNC: 28 MMOL/L (ref 21–32)
CREAT SERPL-MCNC: 0.84 MG/DL (ref 0.66–1.25)
CREAT/UREA NIT SERPL: 11 (ref 12–20)
ERYTHROCYTE [DISTWIDTH] IN BLOOD BY AUTOMATED COUNT: 11.2 %
GFR SERPLBLD CREATININE-BSD FMLA CKD-EPI: >90 MLS/MIN/1.73/M2
GLOBULIN SER-MCNC: 2.8 GM/DL (ref 2–3.9)
GLUCOSE SERPL-MCNC: 111 MG/DL (ref 70–115)
GRAM STN SPEC: NORMAL
GRAM STN SPEC: NORMAL
HCT VFR BLD AUTO: 40.2 % (ref 36–52)
HDLC SERPL-MCNC: 31 MG/DL (ref 40–60)
HGB BLD-MCNC: 13.6 G/DL (ref 13–18)
LDLC SERPL DIRECT ASSAY-SCNC: <30 MG/DL (ref 30–100)
LIPASE SERPL-CCNC: 47 U/L (ref 23–300)
MAGNESIUM SERPL-MCNC: 2 MG/DL (ref 1.8–2.4)
MCH RBC QN AUTO: 30.8 PG (ref 27–34)
MCHC RBC AUTO-ENTMCNC: 33.8 G/DL (ref 31–37)
MCV RBC AUTO: 91.2 FL (ref 79–99)
NRBC BLD AUTO-RTO: 0 %
PLATELET # BLD AUTO: 203 X10(3)/MCL (ref 140–371)
PMV BLD AUTO: 8.8 FL (ref 9.4–12.4)
POTASSIUM SERPL-SCNC: 3.9 MMOL/L (ref 3.5–5.1)
PROT SERPL-MCNC: 6 GM/DL (ref 6.3–8.2)
RBC # BLD AUTO: 4.41 X10(6)/MCL (ref 4–6)
SODIUM SERPL-SCNC: 137 MMOL/L (ref 135–145)
TRIGL SERPL-MCNC: 102 MG/DL (ref 30–200)
VANCOMYCIN TROUGH SERPL-MCNC: 10.2 UG/ML (ref 10–20)
WBC # SPEC AUTO: 7.22 X10(3)/MCL (ref 4–11.5)

## 2023-05-21 PROCEDURE — 25000003 PHARM REV CODE 250: Performed by: FAMILY MEDICINE

## 2023-05-21 PROCEDURE — 94799 UNLISTED PULMONARY SVC/PX: CPT

## 2023-05-21 PROCEDURE — 80202 ASSAY OF VANCOMYCIN: CPT | Performed by: FAMILY MEDICINE

## 2023-05-21 PROCEDURE — 11000001 HC ACUTE MED/SURG PRIVATE ROOM

## 2023-05-21 PROCEDURE — 25000003 PHARM REV CODE 250: Performed by: SURGERY

## 2023-05-21 PROCEDURE — 83690 ASSAY OF LIPASE: CPT | Performed by: FAMILY MEDICINE

## 2023-05-21 PROCEDURE — S0030 INJECTION, METRONIDAZOLE: HCPCS | Performed by: INTERNAL MEDICINE

## 2023-05-21 PROCEDURE — 99900035 HC TECH TIME PER 15 MIN (STAT)

## 2023-05-21 PROCEDURE — 36415 COLL VENOUS BLD VENIPUNCTURE: CPT | Performed by: FAMILY MEDICINE

## 2023-05-21 PROCEDURE — 80061 LIPID PANEL: CPT | Performed by: FAMILY MEDICINE

## 2023-05-21 PROCEDURE — G0378 HOSPITAL OBSERVATION PER HR: HCPCS

## 2023-05-21 PROCEDURE — S4991 NICOTINE PATCH NONLEGEND: HCPCS | Performed by: FAMILY MEDICINE

## 2023-05-21 PROCEDURE — 25000003 PHARM REV CODE 250: Performed by: INTERNAL MEDICINE

## 2023-05-21 PROCEDURE — 63600175 PHARM REV CODE 636 W HCPCS: Performed by: FAMILY MEDICINE

## 2023-05-21 PROCEDURE — 63600175 PHARM REV CODE 636 W HCPCS: Performed by: INTERNAL MEDICINE

## 2023-05-21 PROCEDURE — 80048 BASIC METABOLIC PNL TOTAL CA: CPT | Performed by: FAMILY MEDICINE

## 2023-05-21 PROCEDURE — 94761 N-INVAS EAR/PLS OXIMETRY MLT: CPT

## 2023-05-21 PROCEDURE — 80076 HEPATIC FUNCTION PANEL: CPT | Performed by: FAMILY MEDICINE

## 2023-05-21 PROCEDURE — 83735 ASSAY OF MAGNESIUM: CPT | Performed by: FAMILY MEDICINE

## 2023-05-21 PROCEDURE — 85027 COMPLETE CBC AUTOMATED: CPT | Performed by: FAMILY MEDICINE

## 2023-05-21 RX ADMIN — VANCOMYCIN HYDROCHLORIDE 1750 MG: 1 INJECTION, POWDER, LYOPHILIZED, FOR SOLUTION INTRAVENOUS at 12:05

## 2023-05-21 RX ADMIN — VANCOMYCIN HYDROCHLORIDE 1500 MG: 1.5 INJECTION, POWDER, LYOPHILIZED, FOR SOLUTION INTRAVENOUS at 09:05

## 2023-05-21 RX ADMIN — METRONIDAZOLE 500 MG: 5 INJECTION, SOLUTION INTRAVENOUS at 11:05

## 2023-05-21 RX ADMIN — SODIUM CHLORIDE: 9 INJECTION, SOLUTION INTRAVENOUS at 09:05

## 2023-05-21 RX ADMIN — HYDROCODONE BITARTRATE AND ACETAMINOPHEN 1 TABLET: 5; 325 TABLET ORAL at 11:05

## 2023-05-21 RX ADMIN — NICOTINE 1 PATCH: 21 PATCH, EXTENDED RELEASE TRANSDERMAL at 08:05

## 2023-05-21 RX ADMIN — VANCOMYCIN HYDROCHLORIDE 1500 MG: 1.5 INJECTION, POWDER, LYOPHILIZED, FOR SOLUTION INTRAVENOUS at 01:05

## 2023-05-21 RX ADMIN — METRONIDAZOLE 500 MG: 5 INJECTION, SOLUTION INTRAVENOUS at 02:05

## 2023-05-21 RX ADMIN — HYDROCODONE BITARTRATE AND ACETAMINOPHEN 1 TABLET: 5; 325 TABLET ORAL at 09:05

## 2023-05-21 RX ADMIN — CEFEPIME 1 G: 1 INJECTION, POWDER, FOR SOLUTION INTRAMUSCULAR; INTRAVENOUS at 10:05

## 2023-05-21 RX ADMIN — CEFEPIME 1 G: 1 INJECTION, POWDER, FOR SOLUTION INTRAMUSCULAR; INTRAVENOUS at 06:05

## 2023-05-21 RX ADMIN — METRONIDAZOLE 500 MG: 5 INJECTION, SOLUTION INTRAVENOUS at 06:05

## 2023-05-21 RX ADMIN — MUPIROCIN 1 G: 20 OINTMENT TOPICAL at 09:05

## 2023-05-21 RX ADMIN — CEFEPIME 1 G: 1 INJECTION, POWDER, FOR SOLUTION INTRAMUSCULAR; INTRAVENOUS at 02:05

## 2023-05-21 RX ADMIN — MUPIROCIN 1 G: 20 OINTMENT TOPICAL at 08:05

## 2023-05-21 RX ADMIN — HYDROCODONE BITARTRATE AND ACETAMINOPHEN 1 TABLET: 5; 325 TABLET ORAL at 12:05
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
## 2023-05-21 NOTE — SUBJECTIVE & OBJECTIVE
Interval History:       Review of Systems   Constitutional:  Negative for appetite change, fatigue and fever.   Respiratory:  Negative for cough, shortness of breath and wheezing.    Cardiovascular:  Negative for chest pain and leg swelling.   Gastrointestinal:  Negative for abdominal distention, abdominal pain, constipation, diarrhea, nausea and vomiting.   Skin:  Positive for rash and wound. Negative for color change and pallor.   Neurological:  Negative for tremors, syncope and headaches.   Psychiatric/Behavioral:  Negative for agitation and behavioral problems.    Objective:     Vital Signs (Most Recent):  Temp: 97.6 °F (36.4 °C) (05/21/23 1022)  Pulse: 80 (05/21/23 1022)  Resp: 20 (05/21/23 1022)  BP: 123/80 (05/21/23 1022)  SpO2: 95 % (05/21/23 1022) Vital Signs (24h Range):  Temp:  [96.6 °F (35.9 °C)-98.6 °F (37 °C)] 97.6 °F (36.4 °C)  Pulse:  [54-95] 80  Resp:  [16-20] 20  SpO2:  [95 %-100 %] 95 %  BP: ()/(53-86) 123/80     Weight: 97.7 kg (215 lb 4.8 oz)  Body mass index is 29.2 kg/m².    Intake/Output Summary (Last 24 hours) at 5/21/2023 1057  Last data filed at 5/21/2023 0600  Gross per 24 hour   Intake 3937.92 ml   Output 4925 ml   Net -987.08 ml           Physical Exam  Constitutional:       General: He is not in acute distress.     Appearance: Normal appearance. He is not ill-appearing, toxic-appearing or diaphoretic.   HENT:      Head: Normocephalic and atraumatic.      Nose: Nose normal. No congestion or rhinorrhea.      Mouth/Throat:      Mouth: Mucous membranes are moist.      Pharynx: Oropharynx is clear.   Eyes:      General: No scleral icterus.     Conjunctiva/sclera: Conjunctivae normal.   Neck:      Vascular: No carotid bruit.   Cardiovascular:      Rate and Rhythm: Normal rate and regular rhythm.      Pulses: Normal pulses.      Heart sounds: Normal heart sounds. No murmur heard.  Pulmonary:      Effort: Pulmonary effort is normal. No respiratory distress.      Breath sounds: Normal  breath sounds. No wheezing, rhonchi or rales.   Abdominal:      General: Abdomen is flat. Bowel sounds are normal. There is no distension.      Palpations: Abdomen is soft.      Tenderness: There is no abdominal tenderness. There is no guarding.   Musculoskeletal:         General: No swelling or tenderness. Normal range of motion.      Cervical back: Normal range of motion and neck supple. No tenderness.      Right lower leg: No edema.      Left lower leg: No edema.   Lymphadenopathy:      Cervical: No cervical adenopathy.   Skin:     General: Skin is warm and dry.      Coloration: Skin is not jaundiced or pale.      Findings: Lesion and rash present.      Comments: Large boil on right buttocks near rectum, very red and tender, swollen, surrounding cellulitis   Neurological:      General: No focal deficit present.      Mental Status: He is alert and oriented to person, place, and time. Mental status is at baseline.      Cranial Nerves: No cranial nerve deficit.      Motor: No weakness.      Coordination: Coordination normal.      Gait: Gait normal.   Psychiatric:         Mood and Affect: Mood normal.         Behavior: Behavior normal.         Thought Content: Thought content normal.         Judgment: Judgment normal.           Significant Labs: All pertinent labs within the past 24 hours have been reviewed.  CBC:   Recent Labs   Lab 05/19/23 2329 05/21/23  0548   WBC 10.87 7.22   HGB 13.8 13.6   HCT 39.4 40.2    203       CMP:   Recent Labs   Lab 05/19/23 2329 05/21/23  0549    137   K 3.6 3.9   CO2 25 28   BUN 13.0 9.0   CREATININE 0.95 0.84   CALCIUM 8.9 8.2*   ALBUMIN 3.9 3.2*   BILITOT 0.5 0.4   ALKPHOS 86 62   AST 53 62*   * 112*         Significant Imaging: I have reviewed all pertinent imaging results/findings within the past 24 hours.

## 2023-05-21 NOTE — OP NOTE
[facility]      Date of Procedure: 5/20/2023     Procedure: Procedure(s) (LRB):  INCISION, ABSCESS, PERIRECTAL (N/A) 5 x 3 cm x 2 cm deep  Culturing of the wound aerobic anaerobic Gram stain  Surgeon(s) and Role:     * Jamaal Medrano MD - Primary    Assisting Surgeon: None    Pre-Operative Diagnosis: Perirectal abscess [K61.1]    Post-Operative Diagnosis: Post-Op Diagnosis Codes:     * Perirectal abscess [K61.1] 5 x 3 cm right gluteal    Anesthesia: Choice    Operative Findings (including complications, if any):  Patient is a 42-year-old  male with a history of polysubstance abuse and nicotine addiction who had right gluteal abscess about 3 days ago that needed incision drainage debridement and washout.  Patient underwent incision drainage debridement and washout with a 15 blade scalpel hemostasis Bovie cautery wet-to-dry saline dressings were applied.  The area of incision drainage debridement and washout was 5 x 3 x 2 cm deep.  Patient had cultures obtained aerobic anaerobic Gram stain.  The wound was left open and underwent packing.  Patient does have protruding external and internal hemorrhoids.      Description of Technical Procedures:  Noted above       Significant Surgical Tasks Conducted by the Assistant(s), if Applicable:  None       Estimated Blood Loss (EBL): * No values recorded between 5/20/2023 10:24 PM and 5/20/2023 10:40 PM *           Implants: * No implants in log *    Specimens:   Specimen (24h ago, onward)      None                    Condition: Good    Disposition: PACU - hemodynamically stable.    Attestation: I was present and scrubbed for the entire procedure.    Discharge Note    OUTCOME: Patient tolerated treatment/procedure well without complication and is now ready for discharge.    DISPOSITION: Home or Self Care    FINAL DIAGNOSIS:  Perirectal abscess right gluteal 3 x 5 x 2 cm deep    FOLLOWUP: In clinic 1 week    DISCHARGE  INSTRUCTIONS:  No discharge procedures on file.

## 2023-05-21 NOTE — ANESTHESIA POSTPROCEDURE EVALUATION
Anesthesia Post Evaluation    Patient: Omar Rodriguez    Procedure(s) Performed: Procedure(s) (LRB):  INCISION, ABSCESS, PERIRECTAL (N/A)    Final Anesthesia Type: general      Patient location during evaluation: PACU  Patient participation: Yes- Able to Participate  Level of consciousness: awake and alert, awake and oriented  Post-procedure vital signs: reviewed and stable  Pain management: adequate  Airway patency: patent    PONV status at discharge: No PONV  Anesthetic complications: no      Cardiovascular status: blood pressure returned to baseline  Respiratory status: unassisted, room air and spontaneous ventilation  Hydration status: euvolemic  Follow-up not needed.          Vitals Value Taken Time   BP 99/59 05/20/23 2245   Temp 98.6 05/20/23 2246   Pulse 84 05/20/23 2245   Resp 14 05/20/23 2246   SpO2 95 % 05/20/23 2245   Vitals shown include unvalidated device data.      No case tracking events are documented in the log.      Pain/Karla Score: Pain Rating Prior to Med Admin: 7 (5/19/2023 11:21 PM)  Pain Rating Post Med Admin: 5 (5/19/2023 11:52 PM)

## 2023-05-21 NOTE — DISCHARGE SUMMARY
[facility]  Discharge Note  Short Stay    Procedure(s) (LRB):  INCISION, ABSCESS, PERIRECTAL (N/A) incision drainage with 15 blade scalpel 5 x 3 x 2 cm deep cultures obtained      OUTCOME: Patient tolerated treatment/procedure well without complication and is now ready for discharge.    DISPOSITION: Home or Self Care    FINAL DIAGNOSIS:  Perirectal abscess right gluteal 5 x 3 x 2 cm deep    FOLLOWUP: In clinic 1 week    DISCHARGE INSTRUCTIONS:  No discharge procedures on file.     TIME SPENT ON DISCHARGE:  5 minutes

## 2023-05-21 NOTE — ANESTHESIA PROCEDURE NOTES
Intubation    Date/Time: 5/20/2023 10:10 PM  Performed by: Myke Mendez CRNA  Authorized by: Myke Mendez CRNA     Intubation:     Induction:  Intravenous    Intubated:  Postinduction    Mask Ventilation:  Easy mask    Attempts:  1    Attempted By:  CRNA    Difficult Airway Encountered?: No      Complications:  None    Airway Device:  Supraglottic airway/LMA    Airway Device Size:  3.5    Placement Verified By:  Capnometry and Colorimetric ETCO2 device    Complicating Factors:  None    Findings Post-Intubation:  BS equal bilateral
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
## 2023-05-21 NOTE — PROGRESS NOTES
[facility]/Surg  [facility] Medicine  Progress Note    Patient Name: Omar Rodriguez  MRN: [id number]  Patient Class: OP- Observation   Admission Date: 5/19/2023  Length of Stay: 0 days  Attending Physician: Denise Rodriguez MD  Primary Care Provider: Primary Doctor No        Subjective:     Principal Problem:Perirectal abscess        HPI:  Patient is a 42 y.o. male with a medical history of nicotine addiction and polysubstance abuse including methamphetamine and heroin presents to the ER on account of rectal pain, swelling and redness over the past 3 days.    Patient has been at his usual state of health until about 3 days ago when he developed rectal pain, pain has been persistent, about 8/10 in intensity, radiating to involve the whole of the rectal area, no aggravating or relieving factor.  This has been associated with swelling and redness.  There was also yellowish discharge.  He also complains of fever.  He decided to come to the ER earlier yesterday for which she was evaluated and diagnosis of likely perirectal abscess was made and patient was supposed to undergo drainage.  In left the ER saying that he would come back.  He later came back last night for further evaluation.  Plan was to consult general surgeon for the purpose of incision and drainage later this morning as per my discussion with the ER attending.      Overview/Hospital Course:  05/20/2023 pt admitted with large jeanette rectal abscess, awaiting surgery consult this am.  05/21/2023 patient is status post I&D of perirectal abscess.  On IV antibiotics.  We will have dressing change today.      Interval History:       Review of Systems   Constitutional:  Negative for appetite change, fatigue and fever.   Respiratory:  Negative for cough, shortness of breath and wheezing.    Cardiovascular:  Negative for chest pain and leg swelling.   Gastrointestinal:  Negative for abdominal distention, abdominal pain, constipation, diarrhea, nausea and  vomiting.   Skin:  Positive for rash and wound. Negative for color change and pallor.   Neurological:  Negative for tremors, syncope and headaches.   Psychiatric/Behavioral:  Negative for agitation and behavioral problems.    Objective:     Vital Signs (Most Recent):  Temp: 97.6 °F (36.4 °C) (05/21/23 1022)  Pulse: 80 (05/21/23 1022)  Resp: 20 (05/21/23 1022)  BP: 123/80 (05/21/23 1022)  SpO2: 95 % (05/21/23 1022) Vital Signs (24h Range):  Temp:  [96.6 °F (35.9 °C)-98.6 °F (37 °C)] 97.6 °F (36.4 °C)  Pulse:  [54-95] 80  Resp:  [16-20] 20  SpO2:  [95 %-100 %] 95 %  BP: ()/(53-86) 123/80     Weight: 97.7 kg (215 lb 4.8 oz)  Body mass index is 29.2 kg/m².    Intake/Output Summary (Last 24 hours) at 5/21/2023 1057  Last data filed at 5/21/2023 0600  Gross per 24 hour   Intake 3937.92 ml   Output 4925 ml   Net -987.08 ml           Physical Exam  Constitutional:       General: He is not in acute distress.     Appearance: Normal appearance. He is not ill-appearing, toxic-appearing or diaphoretic.   HENT:      Head: Normocephalic and atraumatic.      Nose: Nose normal. No congestion or rhinorrhea.      Mouth/Throat:      Mouth: Mucous membranes are moist.      Pharynx: Oropharynx is clear.   Eyes:      General: No scleral icterus.     Conjunctiva/sclera: Conjunctivae normal.   Neck:      Vascular: No carotid bruit.   Cardiovascular:      Rate and Rhythm: Normal rate and regular rhythm.      Pulses: Normal pulses.      Heart sounds: Normal heart sounds. No murmur heard.  Pulmonary:      Effort: Pulmonary effort is normal. No respiratory distress.      Breath sounds: Normal breath sounds. No wheezing, rhonchi or rales.   Abdominal:      General: Abdomen is flat. Bowel sounds are normal. There is no distension.      Palpations: Abdomen is soft.      Tenderness: There is no abdominal tenderness. There is no guarding.   Musculoskeletal:         General: No swelling or tenderness. Normal range of motion.      Cervical back:  Normal range of motion and neck supple. No tenderness.      Right lower leg: No edema.      Left lower leg: No edema.   Lymphadenopathy:      Cervical: No cervical adenopathy.   Skin:     General: Skin is warm and dry.      Coloration: Skin is not jaundiced or pale.      Findings: Lesion and rash present.      Comments: Large boil on right buttocks near rectum, very red and tender, swollen, surrounding cellulitis   Neurological:      General: No focal deficit present.      Mental Status: He is alert and oriented to person, place, and time. Mental status is at baseline.      Cranial Nerves: No cranial nerve deficit.      Motor: No weakness.      Coordination: Coordination normal.      Gait: Gait normal.   Psychiatric:         Mood and Affect: Mood normal.         Behavior: Behavior normal.         Thought Content: Thought content normal.         Judgment: Judgment normal.           Significant Labs: All pertinent labs within the past 24 hours have been reviewed.  CBC:   Recent Labs   Lab 05/19/23 2329 05/21/23  0548   WBC 10.87 7.22   HGB 13.8 13.6   HCT 39.4 40.2    203       CMP:   Recent Labs   Lab 05/19/23 2329 05/21/23  0549    137   K 3.6 3.9   CO2 25 28   BUN 13.0 9.0   CREATININE 0.95 0.84   CALCIUM 8.9 8.2*   ALBUMIN 3.9 3.2*   BILITOT 0.5 0.4   ALKPHOS 86 62   AST 53 62*   * 112*         Significant Imaging: I have reviewed all pertinent imaging results/findings within the past 24 hours.      Assessment/Plan:      * Perirectal abscess  Continue iv abx  Change packing today.    Nicotine addiction  Dangers of cigarette smoking were reviewed with patient in detail. Patient was Counseled for 3-10 minutes. Nicotine replacement options were discussed. Nicotine replacement was discussed- prescribed    Transaminitis  High LFTS  Hepatitis panel sent        VTE Risk Mitigation (From admission, onward)         Ordered     IP VTE LOW RISK PATIENT  Once         05/20/23 0333     Place sequential  compression device  Until discontinued         05/20/23 0333                Discharge Planning   AMANDEEP: 5/22/2023     Code Status: Full Code   Is the patient medically ready for discharge?:     Reason for patient still in hospital (select all that apply): Patient unstable                     Phillip Torres MD  Department of Hospital Medicine   [facility]/Surg

## 2023-05-21 NOTE — PROGRESS NOTES
Pharmacokinetic Assessment Follow Up: IV Vancomycin    Vancomycin serum concentration assessment(s):    The trough level was drawn correctly and can be used to guide therapy at this time. The measurement is below the desired definitive target range of 15 to 20 mcg/mL.    Vancomycin Regimen Plan:    Change regimen to Vancomycin 1500 mg IV every 8 hours with next serum trough concentration measured at 60 min prior to 1400 dose on 5-22-23    Drug levels (last 3 results):  Recent Labs   Lab Result Units 05/21/23  1116   Vancomycin Trough ug/ml 10.2       Pharmacy will continue to follow and monitor vancomycin.    Please contact pharmacy at extension [phone] for questions regarding this assessment.    Thank you for the consult,   Oc Mcfarlane       Patient brief summary:  Omar Rodriguez is a 42 y.o. male initiated on antimicrobial therapy with IV Vancomycin for treatment of skin & soft tissue infection      Drug Allergies:   Review of patient's allergies indicates:   Allergen Reactions    Bactrim [sulfamethoxazole-trimethoprim]        Renal Function:   Estimated Creatinine Clearance: 138.7 mL/min (based on SCr of 0.84 mg/dL).,       CBC (last 72 hours):  Recent Labs   Lab Result Units 05/19/23 2329 05/21/23  0548   WBC x10(3)/mcL 10.87 7.22   Hgb g/dL 13.8 13.6   Hct % 39.4 40.2   Platelet x10(3)/mcL 200 203   Mono % % 10.7  --    Eos % % 1.8  --    Basophil % % 0.3  --        Metabolic Panel (last 72 hours):  Recent Labs   Lab Result Units 05/19/23 2329 05/21/23  0549   Sodium Level mmol/L 139 137   Potassium Level mmol/L 3.6 3.9   Chloride mmol/L 108 104   Carbon Dioxide mmol/L 25 28   Glucose Level mg/dL 108 111   Blood Urea Nitrogen mg/dL 13.0 9.0   Creatinine mg/dL 0.95 0.84   Albumin Level g/dL 3.9 3.2*   Bilirubin Total mg/dL 0.5 0.4   Alkaline Phosphatase unit/L 86 62   Aspartate Aminotransferase unit/L 53 62*   Alanine Aminotransferase unit/L 106* 112*   Magnesium Level mg/dL 2.00 2.00       Microbiologic  Results:  Microbiology Results (last 7 days)       Procedure Component Value Units Date/Time    Blood Culture x two cultures. Draw prior to antibiotics [[id number]] Collected: 05/19/23 2329    Order Status: Completed Specimen: Blood from Antecubital, Left Updated: 05/21/23 0400     CULTURE, BLOOD ([facility]) No Growth At 24 Hours    Blood Culture x two cultures. Draw prior to antibiotics [[id number]] Collected: 05/19/23 2335    Order Status: Completed Specimen: Blood from Antecubital, Left Updated: 05/21/23 0400     CULTURE, BLOOD ([facility]) No Growth At 24 Hours    Gram Stain [[id number]] Collected: 05/20/23 2225    Order Status: Sent Specimen: Abscess from Buttocks Updated: 05/20/23 2258    Body Fluid Culture [[id number]] Collected: 05/20/23 2225    Order Status: Sent Specimen: Fluid from Buttocks Updated: 05/20/23 2258    Anaerobic Culture [[id number]] Collected: 05/20/23 2225    Order Status: Sent Specimen: Abscess from Buttocks Updated: 05/20/23 2258

## 2023-05-21 NOTE — ANESTHESIA PREPROCEDURE EVALUATION
05/20/2023  Omar Rodriguez is a 42 y.o., male.      Pre-op Assessment    I have reviewed the Patient Summary Reports.     I have reviewed the Nursing Notes. I have reviewed the NPO Status.   I have reviewed the Medications.     Review of Systems  Anesthesia Hx:  No problems with previous Anesthesia  Denies Family Hx of Anesthesia complications.   Denies Personal Hx of Anesthesia complications.   Social:  Smoker    Hematology/Oncology:  Hematology Normal   Oncology Normal     EENT/Dental:EENT/Dental Normal   Cardiovascular:  Cardiovascular Normal Exercise tolerance: good     Pulmonary:  Pulmonary Normal Smokers cough   Renal/:   Chronic Renal Disease, CKD    Hepatic/GI:   GERD    Musculoskeletal:   Spinal stenosis Spine Disorders: cervical, lumbar and thoracic Disc disease and Degenerative disease    Neurological:   Seizures, well controlled    Endocrine:  Endocrine Normal    Dermatological:  Skin Normal    Psych:   Psychiatric History Adha  Meth one day  Fentanyl uses often other dugs as well         Physical Exam  General: Well nourished, Cooperative, Alert and Oriented    Airway:  Mallampati: II / II  Mouth Opening: Normal  TM Distance: Normal  Tongue: Normal  Neck ROM: Normal ROM    Dental:  Intact        Anesthesia Plan  Type of Anesthesia, risks & benefits discussed:    Anesthesia Type: Gen ETT, MAC  Intra-op Monitoring Plan: Standard ASA Monitors  Post Op Pain Control Plan: multimodal analgesia  Induction:  IV  Airway Plan: Direct  Informed Consent: Informed consent signed with the Patient and all parties understand the risks and agree with anesthesia plan.  All questions answered. Patient consented to blood products? Yes  ASA Score: 3  Day of Surgery Review of History & Physical: H&P Update referred to the surgeon/provider.I have interviewed and examined the patient. I have reviewed the patient's H&P  dated: There are no significant changes.     Ready For Surgery From Anesthesia Perspective.     .

## 2023-05-21 NOTE — PLAN OF CARE
Problem: Adult Inpatient Plan of Care  Goal: Plan of Care Review  Outcome: Ongoing, Progressing  Goal: Patient-Specific Goal (Individualized)  Outcome: Ongoing, Progressing  Goal: Absence of Hospital-Acquired Illness or Injury  Outcome: Ongoing, Progressing  Goal: Optimal Comfort and Wellbeing  Outcome: Ongoing, Progressing  Goal: Readiness for Transition of Care  Outcome: Ongoing, Progressing     Problem: Pain Acute  Goal: Acceptable Pain Control and Functional Ability  Outcome: Ongoing, Progressing     Problem: Infection  Goal: Absence of Infection Signs and Symptoms  Outcome: Ongoing, Progressing     Problem: Impaired Wound Healing  Goal: Optimal Wound Healing  Outcome: Ongoing, Progressing

## 2023-05-22 VITALS
BODY MASS INDEX: 29.24 KG/M2 | RESPIRATION RATE: 20 BRPM | WEIGHT: 215.88 LBS | OXYGEN SATURATION: 98 % | SYSTOLIC BLOOD PRESSURE: 113 MMHG | TEMPERATURE: 97 F | DIASTOLIC BLOOD PRESSURE: 64 MMHG | HEART RATE: 62 BPM | HEIGHT: 72 IN

## 2023-05-22 DIAGNOSIS — K61.1 PERIRECTAL ABSCESS: Primary | ICD-10-CM

## 2023-05-22 LAB
ANION GAP SERPL CALC-SCNC: 4 MEQ/L (ref 2–13)
BASOPHILS # BLD AUTO: 0.05 X10(3)/MCL (ref 0.01–0.08)
BASOPHILS NFR BLD AUTO: 0.9 % (ref 0.1–1.2)
BUN SERPL-MCNC: 12 MG/DL (ref 7–20)
CALCIUM SERPL-MCNC: 8.7 MG/DL (ref 8.4–10.2)
CHLORIDE SERPL-SCNC: 105 MMOL/L (ref 98–110)
CO2 SERPL-SCNC: 28 MMOL/L (ref 21–32)
CREAT SERPL-MCNC: 0.87 MG/DL (ref 0.66–1.25)
CREAT/UREA NIT SERPL: 14 (ref 12–20)
EOSINOPHIL # BLD AUTO: 0.3 X10(3)/MCL (ref 0.04–0.54)
EOSINOPHIL NFR BLD AUTO: 5.7 % (ref 0.7–7)
ERYTHROCYTE [DISTWIDTH] IN BLOOD BY AUTOMATED COUNT: 11.1 %
GFR SERPLBLD CREATININE-BSD FMLA CKD-EPI: >90 MLS/MIN/1.73/M2
GLUCOSE SERPL-MCNC: 99 MG/DL (ref 70–115)
HAV IGM SERPL QL IA: NONREACTIVE
HBV CORE IGM SERPL QL IA: NONREACTIVE
HBV SURFACE AG SERPL QL IA: NONREACTIVE
HCT VFR BLD AUTO: 39.1 % (ref 36–52)
HCV AB SERPL QL IA: REACTIVE
HGB BLD-MCNC: 13.5 G/DL (ref 13–18)
IMM GRANULOCYTES # BLD AUTO: 0.04 X10(3)/MCL (ref 0–0.03)
IMM GRANULOCYTES NFR BLD AUTO: 0.8 % (ref 0–0.5)
LYMPHOCYTES # BLD AUTO: 1.25 X10(3)/MCL (ref 1.32–3.57)
LYMPHOCYTES NFR BLD AUTO: 23.7 % (ref 20–55)
MCH RBC QN AUTO: 31.3 PG (ref 27–34)
MCHC RBC AUTO-ENTMCNC: 34.5 G/DL (ref 31–37)
MCV RBC AUTO: 90.7 FL (ref 79–99)
MONOCYTES # BLD AUTO: 0.79 X10(3)/MCL (ref 0.3–0.82)
MONOCYTES NFR BLD AUTO: 15 % (ref 4.7–12.5)
NEUTROPHILS # BLD AUTO: 2.85 X10(3)/MCL (ref 1.78–5.38)
NEUTROPHILS NFR BLD AUTO: 53.9 % (ref 37–73)
NRBC BLD AUTO-RTO: 0 %
PLATELET # BLD AUTO: 208 X10(3)/MCL (ref 140–371)
PMV BLD AUTO: 8.6 FL (ref 9.4–12.4)
POTASSIUM SERPL-SCNC: 4.2 MMOL/L (ref 3.5–5.1)
RBC # BLD AUTO: 4.31 X10(6)/MCL (ref 4–6)
SODIUM SERPL-SCNC: 137 MMOL/L (ref 135–145)
VANCOMYCIN TROUGH SERPL-MCNC: 15.6 UG/ML (ref 10–20)
WBC # SPEC AUTO: 5.28 X10(3)/MCL (ref 4–11.5)

## 2023-05-22 PROCEDURE — 11000001 HC ACUTE MED/SURG PRIVATE ROOM

## 2023-05-22 PROCEDURE — 80202 ASSAY OF VANCOMYCIN: CPT | Performed by: FAMILY MEDICINE

## 2023-05-22 PROCEDURE — 85025 COMPLETE CBC W/AUTO DIFF WBC: CPT | Performed by: INTERNAL MEDICINE

## 2023-05-22 PROCEDURE — S0030 INJECTION, METRONIDAZOLE: HCPCS | Performed by: INTERNAL MEDICINE

## 2023-05-22 PROCEDURE — 25000003 PHARM REV CODE 250: Performed by: INTERNAL MEDICINE

## 2023-05-22 PROCEDURE — 25000003 PHARM REV CODE 250: Performed by: FAMILY MEDICINE

## 2023-05-22 PROCEDURE — 63600175 PHARM REV CODE 636 W HCPCS: Performed by: FAMILY MEDICINE

## 2023-05-22 PROCEDURE — 80048 BASIC METABOLIC PNL TOTAL CA: CPT | Performed by: INTERNAL MEDICINE

## 2023-05-22 PROCEDURE — 36415 COLL VENOUS BLD VENIPUNCTURE: CPT | Performed by: FAMILY MEDICINE

## 2023-05-22 PROCEDURE — 99900035 HC TECH TIME PER 15 MIN (STAT)

## 2023-05-22 PROCEDURE — S4991 NICOTINE PATCH NONLEGEND: HCPCS | Performed by: FAMILY MEDICINE

## 2023-05-22 PROCEDURE — 36415 COLL VENOUS BLD VENIPUNCTURE: CPT | Performed by: INTERNAL MEDICINE

## 2023-05-22 PROCEDURE — 94761 N-INVAS EAR/PLS OXIMETRY MLT: CPT

## 2023-05-22 PROCEDURE — 63600175 PHARM REV CODE 636 W HCPCS: Performed by: INTERNAL MEDICINE

## 2023-05-22 PROCEDURE — 25000003 PHARM REV CODE 250: Performed by: SURGERY

## 2023-05-22 RX ORDER — CLINDAMYCIN HYDROCHLORIDE 300 MG/1
300 CAPSULE ORAL EVERY 6 HOURS
Qty: 28 CAPSULE | Refills: 0 | Status: SHIPPED | OUTPATIENT
Start: 2023-05-22 | End: 2023-05-29

## 2023-05-22 RX ADMIN — MUPIROCIN 1 G: 20 OINTMENT TOPICAL at 09:05

## 2023-05-22 RX ADMIN — CEFEPIME 1 G: 1 INJECTION, POWDER, FOR SOLUTION INTRAMUSCULAR; INTRAVENOUS at 02:05

## 2023-05-22 RX ADMIN — CEFEPIME 1 G: 1 INJECTION, POWDER, FOR SOLUTION INTRAMUSCULAR; INTRAVENOUS at 10:05

## 2023-05-22 RX ADMIN — METRONIDAZOLE 500 MG: 5 INJECTION, SOLUTION INTRAVENOUS at 11:05

## 2023-05-22 RX ADMIN — METRONIDAZOLE 500 MG: 5 INJECTION, SOLUTION INTRAVENOUS at 02:05

## 2023-05-22 RX ADMIN — NICOTINE 1 PATCH: 21 PATCH, EXTENDED RELEASE TRANSDERMAL at 08:05

## 2023-05-22 RX ADMIN — VANCOMYCIN HYDROCHLORIDE 1500 MG: 1.5 INJECTION, POWDER, LYOPHILIZED, FOR SOLUTION INTRAVENOUS at 05:05

## 2023-05-22 RX ADMIN — VANCOMYCIN HYDROCHLORIDE 1500 MG: 1.5 INJECTION, POWDER, LYOPHILIZED, FOR SOLUTION INTRAVENOUS at 01:05

## 2023-05-22 RX ADMIN — HYDROCODONE BITARTRATE AND ACETAMINOPHEN 1 TABLET: 5; 325 TABLET ORAL at 08:05

## 2023-05-22 NOTE — PROGRESS NOTES
Inpatient Nutrition Evaluation    Admit Date: 5/19/2023   Total duration of encounter: 2 days    Nutrition Recommendation/Prescription     Continue Regular diet as tolerated  Consider ONS if p.o intake declines  Monitor wts, labs, po intake  RD to f/u & adjust MNT accordingly    Nutrition Assessment     Chart Review    Reason Seen: malnutrition screening tool (MST)    Malnutrition Screening Tool Results   Have you recently lost weight without trying?: Yes: 14-23 lbs  Have you been eating poorly because of a decreased appetite?: Yes   MST Score: 3     Diagnosis:  PERIRECTAL ABSCESS, NICOTINE ADDICTION, TRANSAMINITIS    Relevant Medical History:  POLYSUBSTANCE ABUSE      Diet Order: Diet Adult Regular (IDDSI Level 7)  Oral Supplement Order: none  Appetite/Oral Intake: good/% of meals  Factors Affecting Nutritional Intake: none identified  Food/Restorationist/Cultural Preferences: unable to obtain  Food Allergies: no known food allergies    Skin Integrity: wound, other (see comments) (right inner buttock)  Wound(s):   S/P I&D    Comments    (5/21) 42 Y.O MALE ADMITTED W/PERIRECTAL ABSCESS S/P I&D, REGULAR DIET - GOOD APT CONSUMED 100% X2 MEALS, NO SWALLOWING DIFFICULTY PER FXN SCREEN, NUTR SCORE: 4, BRDN SCORE: 21, I/O: 1822.6/1050 PER EMR    Anthropometrics    Height: 6' (182.9 cm) Height Method: Stated  Last Weight: 97.7 kg (215 lb 4.8 oz) (05/20/23 1921) Weight Method: Bed Scale  BMI (Calculated): 29.2  BMI Classification: overweight (BMI 25-29.9)        Ideal Body Weight (IBW), Male: 178 lb     % Ideal Body Weight, Male (lb): 120.96 %                          Usual Weight Provided By: EMR weight history    Wt Readings from Last 5 Encounters:   05/20/23 97.7 kg (215 lb 4.8 oz)   05/19/23 97.5 kg (215 lb)   02/24/23 93 kg (205 lb)     Weight Change(s) Since Admission:  Admit Weight: 97.5 kg (215 lb) (05/19/23 2302)  No significant wt loss since admit  Pt reports significant wt loss in malnutrition screening - unable  to verify, no significant wt loss since 02/24/2023 per EMR    Patient Education    Not applicable.    Monitoring & Evaluation     Dietitian will monitor food and beverage intake, weight, and weight change.  Nutrition Risk/Follow-Up: Moderate (assess in 24-48 hours)  Patient assigned 'moderate to high nutrition risk' RD to complete a full nutrition assessment completed in 24-48 hours.

## 2023-05-22 NOTE — PLAN OF CARE
Problem: Adult Inpatient Plan of Care  Goal: Plan of Care Review  Outcome: Met  Goal: Patient-Specific Goal (Individualized)  Outcome: Met  Goal: Absence of Hospital-Acquired Illness or Injury  Outcome: Met  Goal: Optimal Comfort and Wellbeing  Outcome: Met  Goal: Readiness for Transition of Care  Outcome: Met     Problem: Pain Acute  Goal: Acceptable Pain Control and Functional Ability  Outcome: Met     Problem: Infection  Goal: Absence of Infection Signs and Symptoms  Outcome: Met     Problem: Impaired Wound Healing  Goal: Optimal Wound Healing  Outcome: Met

## 2023-05-22 NOTE — DISCHARGE SUMMARY
[facility]/Surg  Hospital Medicine  Discharge Summary      Patient Name: Omar Rodriguez  MRN: [id number]  MATTHEW: [id number]  Patient Class: IP- Inpatient  Admission Date: 5/19/2023  Hospital Length of Stay: 0 days  Discharge Date and Time:  05/22/2023 12:51 PM  Attending Physician: Denise Rodriguez MD   Discharging Provider: Phillip Torres MD  Primary Care Provider: Primary Doctor No    Primary Care Team: Networked reference to record PCT     HPI:   Patient is a 42 y.o. male with a medical history of nicotine addiction and polysubstance abuse including methamphetamine and heroin presents to the ER on account of rectal pain, swelling and redness over the past 3 days.    Patient has been at his usual state of health until about 3 days ago when he developed rectal pain, pain has been persistent, about 8/10 in intensity, radiating to involve the whole of the rectal area, no aggravating or relieving factor.  This has been associated with swelling and redness.  There was also yellowish discharge.  He also complains of fever.  He decided to come to the ER earlier yesterday for which she was evaluated and diagnosis of likely perirectal abscess was made and patient was supposed to undergo drainage.  In left the ER saying that he would come back.  He later came back last night for further evaluation.  Plan was to consult general surgeon for the purpose of incision and drainage later this morning as per my discussion with the ER attending.      Procedure(s) (LRB):  INCISION, ABSCESS, PERIRECTAL (N/A)      Hospital Course:   05/20/2023 pt admitted with large jeanette rectal abscess, awaiting surgery consult this am.  05/21/2023 patient is status post I&D of perirectal abscess.  On IV antibiotics.  We will have dressing change today.  05/22/2023 brief discharge summary:  Young patient presented with perirectal pain in the emergency room was found to have large perirectal abscess.  Hospital course patient was admitted  to the hospital given IV antibiotics patient underwent I and D with Dr. Medrano.  He is now getting dressing changes he is afebrile stable can be discharged home.  We will send him home on Cleocin 300 mg 4 times a day for 7 days.  He will follow with Dr. Medrano.   is arranging for wound care to be done here on [address] on a daily basis.       Goals of Care Treatment Preferences:  Code Status: Full Code      Consults:   Consults (From admission, onward)        Status Ordering Provider     Pharmacy to dose Vancomycin consult  Once        Provider:  (Not yet assigned)   See [facility] for full Linked Orders Report.    Acknowledged EUSEBIO DOMINGUEZ     Inpatient consult to General Surgery  Once        Provider:  Jamaal Medrano MD    Acknowledged EUSEBIO DOMINGUEZ     Inpatient virtual consult to Hospital Medicine  Once        Provider:  (Not yet assigned)    Acknowledged EUSEBIO DOMINGUEZ          No new Assessment & Plan notes have been filed under this hospital service since the last note was generated.  Service: Hospital Medicine    Final Active Diagnoses:    Diagnosis Date Noted POA    PRINCIPAL PROBLEM:  Perirectal abscess [K61.1] 05/19/2023 Yes    Transaminitis [R74.01] 05/20/2023 Yes    Nicotine addiction [F17.200] 05/20/2023 Yes      Problems Resolved During this Admission:       Discharged Condition: good    Disposition: Home or Self Care    Follow Up:   Follow-up Information     Primary Doctor No Follow up in 1 week(s).                     Patient Instructions:      Reason for not Ordering Smoking Cessation Referral     Order Specific Question Answer Comments   Reason for not ordering: Not medically appropriate at this time      Reason for not Prescribing Nicotine Replacement     Order Specific Question Answer Comments   Reason for not Prescribing: Not medically appropriate at this time        Significant Diagnostic Studies:      Pending Diagnostic Studies:     Procedure Component  Value Units Date/Time    Pathologist Interpretation [[id number]] Collected: 05/20/23 0442    Order Status: Sent Lab Status: In process Updated: 05/22/23 1155    Specimen: Blood     VANCOMYCIN, TROUGH [[id number]]     Order Status: Sent Lab Status: No result     Specimen: Blood          Medications:  Reconciled Home Medications:      Medication List      START taking these medications    clindamycin 300 MG capsule  Commonly known as: CLEOCIN  Take 1 capsule (300 mg total) by mouth every 6 (six) hours. for 7 days        ASK your doctor about these medications    amoxicillin-clavulanate 875-125mg 875-125 mg per tablet  Commonly known as: AUGMENTIN  Take 1 tablet by mouth 2 (two) times daily.     mupirocin 2 % ointment  Commonly known as: BACTROBAN  Apply topically 3 (three) times daily.            Indwelling Lines/Drains at time of discharge:   Lines/Drains/Airways     None                 Time spent on the discharge of patient: 36 minutes     Physical Exam  Constitutional:       General: He is not in acute distress.     Appearance: Normal appearance. He is not ill-appearing, toxic-appearing or diaphoretic.   HENT:      Head: Normocephalic and atraumatic.      Nose: Nose normal. No congestion or rhinorrhea.      Mouth/Throat:      Mouth: Mucous membranes are moist.      Pharynx: Oropharynx is clear.   Eyes:      General: No scleral icterus.     Conjunctiva/sclera: Conjunctivae normal.   Neck:      Vascular: No carotid bruit.   Cardiovascular:      Rate and Rhythm: Normal rate and regular rhythm.      Pulses: Normal pulses.      Heart sounds: Normal heart sounds. No murmur heard.  Pulmonary:      Effort: Pulmonary effort is normal. No respiratory distress.      Breath sounds: Normal breath sounds. No wheezing, rhonchi or rales.   Abdominal:      General: Abdomen is flat. Bowel sounds are normal. There is no distension.      Palpations: Abdomen is soft.      Tenderness: There is no abdominal tenderness. There is no  guarding.   Musculoskeletal:         General: No swelling or tenderness. Normal range of motion.      Cervical back: Normal range of motion and neck supple. No tenderness.      Right lower leg: No edema.      Left lower leg: No edema.   Lymphadenopathy:      Cervical: No cervical adenopathy.   Skin:     General: Skin is warm and dry.      Coloration: Skin is not jaundiced or pale.      Findings: Lesion and rash present.      Comments: Large boil on right buttocks near rectum, very red and tender, swollen, surrounding cellulitis   Neurological:      General: No focal deficit present.      Mental Status: He is alert and oriented to person, place, and time. Mental status is at baseline.      Cranial Nerves: No cranial nerve deficit.      Motor: No weakness.      Coordination: Coordination normal.      Gait: Gait normal.   Psychiatric:         Mood and Affect: Mood normal.         Behavior: Behavior normal.         Thought Content: Thought content normal.         Judgment: Judgment normal.     Phillip Torres MD  Department of Hospital Medicine  [facility]/Surg
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
## 2023-05-22 NOTE — PROGRESS NOTES
Pharmacokinetic Assessment Follow Up: IV Vancomycin    Vancomycin serum concentration assessment(s):    The trough level was drawn correctly and can be used to guide therapy at this time. The measurement is within the desired definitive target range of 15 to 20 mcg/mL.    Vancomycin Regimen Plan:    Continue regimen to Vancomycin 1500 mg IV every 8 hours with next serum trough concentration measured at 0500 on 05/24/2023    Drug levels (last 3 results):  Recent Labs   Lab Result Units 05/21/23  1116 05/22/23  1302   Vancomycin Trough ug/ml 10.2 15.6       Pharmacy will continue to follow and monitor vancomycin.    Please contact pharmacy at [email or web address] [phone] for questions regarding this assessment.    Thank you for the consult,   Raven Engel, [facility].  [phone]         Patient brief summary:  Omar Rodriguez is a 42 y.o. male initiated on antimicrobial therapy with IV Vancomycin for treatment of skin & soft tissue infection      Drug Allergies:   Review of patient's allergies indicates:   Allergen Reactions    Bactrim [sulfamethoxazole-trimethoprim]        Renal Function:   Estimated Creatinine Clearance: 134.1 mL/min (based on SCr of 0.87 mg/dL).,       CBC (last 72 hours):  Recent Labs   Lab Result Units 05/19/23 2329 05/21/23  0548 05/22/23  0516   WBC x10(3)/mcL 10.87 7.22 5.28   Hgb g/dL 13.8 13.6 13.5   Hct % 39.4 40.2 39.1   Platelet x10(3)/mcL 200 203 208   Mono % % 10.7  --  15.0*   Eos % % 1.8  --  5.7   Basophil % % 0.3  --  0.9       Metabolic Panel (last 72 hours):  Recent Labs   Lab Result Units 05/19/23 2329 05/21/23  0549 05/22/23  0516   Sodium Level mmol/L 139 137 137   Potassium Level mmol/L 3.6 3.9 4.2   Chloride mmol/L 108 104 105   Carbon Dioxide mmol/L 25 28 28   Glucose Level mg/dL 108 111 99   Blood Urea Nitrogen mg/dL 13.0 9.0 12.0   Creatinine mg/dL 0.95 0.84 0.87   Albumin Level g/dL 3.9 3.2*  --    Bilirubin Total mg/dL 0.5 0.4  --    Alkaline Phosphatase unit/L 86 62  --    Aspartate  Aminotransferase unit/L 53 62*  --    Alanine Aminotransferase unit/L 106* 112*  --    Magnesium Level mg/dL 2.00 2.00  --        Microbiologic Results:  Microbiology Results (last 7 days)       Procedure Component Value Units Date/Time    Body Fluid Culture [[id number]]  (Abnormal) Collected: 05/20/23 2225    Order Status: Completed Specimen: Fluid from Buttocks Updated: 05/22/23 0633     Body Fluid Culture Many Staphylococcus aureus    Blood Culture x two cultures. Draw prior to antibiotics [[id number]] Collected: 05/19/23 2329    Order Status: Completed Specimen: Blood from Antecubital, Left Updated: 05/22/23 0401     CULTURE, BLOOD ([facility]) No Growth At 48 Hours    Blood Culture x two cultures. Draw prior to antibiotics [[id number]] Collected: 05/19/23 2335    Order Status: Completed Specimen: Blood from Antecubital, Left Updated: 05/22/23 0401     CULTURE, BLOOD ([facility]) No Growth At 48 Hours    Gram Stain [[id number]] Collected: 05/20/23 2225    Order Status: Completed Specimen: Abscess from Buttocks Updated: 05/21/23 1257     GRAM STAIN Many WBC observed      Many Gram positive cocci    Anaerobic Culture [[id number]] Collected: 05/20/23 2225    Order Status: Sent Specimen: Abscess from Buttocks Updated: 05/20/23 2251

## 2023-05-22 NOTE — INTERVAL H&P NOTE
The patient has been examined and the H&P has been reviewed:    I concur with the findings and no changes have occurred since H&P was written.    Surgery risks, benefits and alternative options discussed and understood by patient/family.          Active Hospital Problems    Diagnosis  POA    *Perirectal abscess [K61.1]  Yes    Transaminitis [R74.01]  Yes    Nicotine addiction [F17.200]  Yes      Resolved Hospital Problems   No resolved problems to display.

## 2023-05-23 ENCOUNTER — CLINICAL SUPPORT (OUTPATIENT)
Dept: INFUSION THERAPY | Facility: HOSPITAL | Age: 43
DRG: 345 | End: 2023-05-23
Attending: INTERNAL MEDICINE
Payer: MEDICAID

## 2023-05-23 VITALS
TEMPERATURE: 97 F | SYSTOLIC BLOOD PRESSURE: 131 MMHG | DIASTOLIC BLOOD PRESSURE: 92 MMHG | RESPIRATION RATE: 20 BRPM | OXYGEN SATURATION: 98 % | HEART RATE: 86 BPM

## 2023-05-23 DIAGNOSIS — K61.1 PERIRECTAL ABSCESS: Primary | ICD-10-CM

## 2023-05-23 LAB
BACTERIA FLD CULT: ABNORMAL
PATH REV: NORMAL

## 2023-05-23 PROCEDURE — 99211 OFF/OP EST MAY X REQ PHY/QHP: CPT

## 2023-05-23 NOTE — PLAN OF CARE
Pt here for wound care to perineal I&D. See wound care flowsheet for wound charting. Pt tolerated procedure well, instructed to call MD for and questions or concerns.

## 2023-05-24 ENCOUNTER — CLINICAL SUPPORT (OUTPATIENT)
Dept: INFUSION THERAPY | Facility: HOSPITAL | Age: 43
End: 2023-05-24
Attending: INTERNAL MEDICINE
Payer: MEDICAID

## 2023-05-24 VITALS
RESPIRATION RATE: 20 BRPM | DIASTOLIC BLOOD PRESSURE: 91 MMHG | OXYGEN SATURATION: 98 % | SYSTOLIC BLOOD PRESSURE: 137 MMHG | HEART RATE: 80 BPM | TEMPERATURE: 98 F

## 2023-05-24 DIAGNOSIS — K61.1 PERIRECTAL ABSCESS: Primary | ICD-10-CM

## 2023-05-24 DIAGNOSIS — K61.1 PERIRECTAL ABSCESS: ICD-10-CM

## 2023-05-24 LAB — BACTERIA SPEC ANAEROBE CULT: NORMAL

## 2023-05-24 PROCEDURE — 99211 OFF/OP EST MAY X REQ PHY/QHP: CPT

## 2023-05-24 NOTE — PLAN OF CARE
Dressing changed per dressing change order, Patient tolerated it well, Discharged home in stable condition, No s/s of distress noted,

## 2023-05-25 ENCOUNTER — CLINICAL SUPPORT (OUTPATIENT)
Dept: INFUSION THERAPY | Facility: HOSPITAL | Age: 43
End: 2023-05-25
Attending: INTERNAL MEDICINE
Payer: MEDICAID

## 2023-05-25 VITALS
SYSTOLIC BLOOD PRESSURE: 128 MMHG | RESPIRATION RATE: 18 BRPM | OXYGEN SATURATION: 98 % | DIASTOLIC BLOOD PRESSURE: 90 MMHG | HEART RATE: 81 BPM | TEMPERATURE: 98 F

## 2023-05-25 LAB
BACTERIA BLD CULT: NORMAL
BACTERIA BLD CULT: NORMAL

## 2023-05-25 PROCEDURE — 99211 OFF/OP EST MAY X REQ PHY/QHP: CPT

## 2023-05-26 ENCOUNTER — CLINICAL SUPPORT (OUTPATIENT)
Dept: INFUSION THERAPY | Facility: HOSPITAL | Age: 43
End: 2023-05-26
Attending: INTERNAL MEDICINE
Payer: MEDICAID

## 2023-05-26 VITALS
RESPIRATION RATE: 20 BRPM | DIASTOLIC BLOOD PRESSURE: 79 MMHG | HEART RATE: 77 BPM | OXYGEN SATURATION: 99 % | SYSTOLIC BLOOD PRESSURE: 119 MMHG | TEMPERATURE: 98 F

## 2023-05-26 PROCEDURE — 99211 OFF/OP EST MAY X REQ PHY/QHP: CPT

## 2023-05-26 NOTE — PLAN OF CARE
PATIENT TOLERATED WOUND CARE WELL. AMBULATED OFF THE UNIT. IN GOOD CONDITION. NO S/S OF DISTRESS NOTED.

## 2023-05-29 ENCOUNTER — CLINICAL SUPPORT (OUTPATIENT)
Dept: INFUSION THERAPY | Facility: HOSPITAL | Age: 43
End: 2023-05-29
Attending: INTERNAL MEDICINE
Payer: MEDICAID

## 2023-05-29 VITALS
OXYGEN SATURATION: 99 % | HEART RATE: 74 BPM | DIASTOLIC BLOOD PRESSURE: 80 MMHG | RESPIRATION RATE: 18 BRPM | SYSTOLIC BLOOD PRESSURE: 118 MMHG | TEMPERATURE: 97 F

## 2023-05-29 PROCEDURE — 99211 OFF/OP EST MAY X REQ PHY/QHP: CPT

## 2023-05-31 ENCOUNTER — CLINICAL SUPPORT (OUTPATIENT)
Dept: INFUSION THERAPY | Facility: HOSPITAL | Age: 43
End: 2023-05-31
Attending: INTERNAL MEDICINE
Payer: MEDICAID

## 2023-05-31 VITALS
RESPIRATION RATE: 18 BRPM | TEMPERATURE: 98 F | HEART RATE: 92 BPM | OXYGEN SATURATION: 96 % | DIASTOLIC BLOOD PRESSURE: 75 MMHG | SYSTOLIC BLOOD PRESSURE: 121 MMHG

## 2023-05-31 PROCEDURE — 99211 OFF/OP EST MAY X REQ PHY/QHP: CPT

## 2023-05-31 NOTE — PROGRESS NOTES
OLD DRESSING REMOVED FROM LEFT PERIRECTAL REGION , WOUND CLEANSED WITH NS , WOUND PACKED WITH NS moist gauze, SKIN PREP APPLIED TO SKIN, WOUND COVERED WITH 4x4s AND SECURED WITH MEDIPORE TAPE, PATIENT TOLERATED WELL.

## 2023-06-05 ENCOUNTER — CLINICAL SUPPORT (OUTPATIENT)
Dept: INFUSION THERAPY | Facility: HOSPITAL | Age: 43
End: 2023-06-05
Attending: INTERNAL MEDICINE
Payer: MEDICAID

## 2023-06-05 VITALS
TEMPERATURE: 98 F | RESPIRATION RATE: 18 BRPM | OXYGEN SATURATION: 98 % | SYSTOLIC BLOOD PRESSURE: 122 MMHG | HEART RATE: 90 BPM | DIASTOLIC BLOOD PRESSURE: 78 MMHG

## 2023-06-05 PROCEDURE — 99211 OFF/OP EST MAY X REQ PHY/QHP: CPT

## 2023-06-06 ENCOUNTER — CLINICAL SUPPORT (OUTPATIENT)
Dept: INFUSION THERAPY | Facility: HOSPITAL | Age: 43
End: 2023-06-06
Attending: INTERNAL MEDICINE
Payer: MEDICAID

## 2023-06-06 VITALS
OXYGEN SATURATION: 98 % | TEMPERATURE: 98 F | HEART RATE: 92 BPM | SYSTOLIC BLOOD PRESSURE: 126 MMHG | DIASTOLIC BLOOD PRESSURE: 72 MMHG | RESPIRATION RATE: 18 BRPM

## 2023-06-06 PROCEDURE — 99211 OFF/OP EST MAY X REQ PHY/QHP: CPT

## 2023-06-06 NOTE — PLAN OF CARE
Removed old dressing, Cleansed wound with Normal Saline, Covered with Allevyn Life dressing, Wound is no longer big enough to pack, Suture noted to the superior and to the inferior aspect of wound

## 2023-06-07 ENCOUNTER — CLINICAL SUPPORT (OUTPATIENT)
Dept: INFUSION THERAPY | Facility: HOSPITAL | Age: 43
End: 2023-06-07
Attending: INTERNAL MEDICINE
Payer: MEDICAID

## 2023-06-07 VITALS
DIASTOLIC BLOOD PRESSURE: 87 MMHG | SYSTOLIC BLOOD PRESSURE: 132 MMHG | OXYGEN SATURATION: 100 % | HEART RATE: 97 BPM | TEMPERATURE: 98 F | RESPIRATION RATE: 20 BRPM

## 2023-06-07 PROCEDURE — 99211 OFF/OP EST MAY X REQ PHY/QHP: CPT

## 2023-06-07 NOTE — PLAN OF CARE
PT ARRIVED WITH NO DRESSING IN PLACE. WOUND CLEANSED WITH NS, UNABLE TO PLACE PACKING IN WOUND BED DUE TO WOUND NEAR CLOSURE, WOUND COVERED WITH ALLEVYN, PATIENT TOLERATED WELL. AMBULATED OUT OF DEPT.

## 2023-06-08 ENCOUNTER — CLINICAL SUPPORT (OUTPATIENT)
Dept: INFUSION THERAPY | Facility: HOSPITAL | Age: 43
End: 2023-06-08
Attending: INTERNAL MEDICINE
Payer: MEDICAID

## 2023-06-08 VITALS
HEART RATE: 98 BPM | SYSTOLIC BLOOD PRESSURE: 117 MMHG | OXYGEN SATURATION: 99 % | TEMPERATURE: 98 F | RESPIRATION RATE: 18 BRPM | DIASTOLIC BLOOD PRESSURE: 78 MMHG

## 2023-06-08 PROCEDURE — 99211 OFF/OP EST MAY X REQ PHY/QHP: CPT

## 2023-06-08 NOTE — PLAN OF CARE
Patient arrived without a dressing in place, Pictures taken of wound, Cleaned with Normal Saline, Covered with Allevyn life dressing, Suture noted to the superior aspect and to the inferior aspect of wound,

## 2023-06-09 ENCOUNTER — CLINICAL SUPPORT (OUTPATIENT)
Dept: INFUSION THERAPY | Facility: HOSPITAL | Age: 43
End: 2023-06-09
Attending: INTERNAL MEDICINE
Payer: MEDICAID

## 2023-06-09 VITALS
TEMPERATURE: 98 F | OXYGEN SATURATION: 99 % | RESPIRATION RATE: 20 BRPM | SYSTOLIC BLOOD PRESSURE: 128 MMHG | DIASTOLIC BLOOD PRESSURE: 88 MMHG | HEART RATE: 84 BPM

## 2023-06-09 PROCEDURE — 99211 OFF/OP EST MAY X REQ PHY/QHP: CPT

## 2023-06-09 NOTE — PLAN OF CARE
X3 SUTURES REMOVED. PT TOLERATED WELL.  RT BUTTOCKS WOUND CLEANED WITH NS. CUT 4X4 GAUZE IN HALF AND APPLIED SALINE TO GAUZE. APPLIED THE SALINE SOAKED GAUZE THEN PUP PAD TO WOUND. PT TOLERATED WELL.

## 2023-06-12 ENCOUNTER — CLINICAL SUPPORT (OUTPATIENT)
Dept: INFUSION THERAPY | Facility: HOSPITAL | Age: 43
End: 2023-06-12
Attending: INTERNAL MEDICINE
Payer: MEDICAID

## 2023-06-12 VITALS
DIASTOLIC BLOOD PRESSURE: 92 MMHG | TEMPERATURE: 98 F | OXYGEN SATURATION: 98 % | RESPIRATION RATE: 20 BRPM | HEART RATE: 95 BPM | SYSTOLIC BLOOD PRESSURE: 151 MMHG

## 2023-06-12 PROCEDURE — 99211 OFF/OP EST MAY X REQ PHY/QHP: CPT

## 2023-06-12 NOTE — PLAN OF CARE
NO DRESSING IN PLACE UPON ARRIVAL, WOUND TO PERIRECTAL CLEANSED WITH NS , SKIN PREP APPLIED TO SKIN, WOUND COVERED WITH ALLEVYN,  PATIENT TOLERATED WELL, AMBULATED OUT OF DEPT.

## 2023-06-13 ENCOUNTER — CLINICAL SUPPORT (OUTPATIENT)
Dept: INFUSION THERAPY | Facility: HOSPITAL | Age: 43
End: 2023-06-13
Attending: INTERNAL MEDICINE
Payer: MEDICAID

## 2023-06-13 VITALS
SYSTOLIC BLOOD PRESSURE: 147 MMHG | OXYGEN SATURATION: 97 % | RESPIRATION RATE: 20 BRPM | HEART RATE: 92 BPM | DIASTOLIC BLOOD PRESSURE: 87 MMHG | TEMPERATURE: 98 F

## 2023-06-13 PROCEDURE — 99211 OFF/OP EST MAY X REQ PHY/QHP: CPT

## 2023-06-13 NOTE — PLAN OF CARE
WOUND TO PERIRECTAL REGION CLEANSED WITH NORMAL SALINE , WOUND COVERED WITH ALLEVYN, PATIENT TOLERATED WELL.

## 2023-06-20 ENCOUNTER — CLINICAL SUPPORT (OUTPATIENT)
Dept: INFUSION THERAPY | Facility: HOSPITAL | Age: 43
End: 2023-06-20
Attending: INTERNAL MEDICINE
Payer: MEDICAID

## 2023-06-20 VITALS
RESPIRATION RATE: 20 BRPM | TEMPERATURE: 98 F | OXYGEN SATURATION: 97 % | HEART RATE: 98 BPM | SYSTOLIC BLOOD PRESSURE: 137 MMHG | DIASTOLIC BLOOD PRESSURE: 94 MMHG

## 2023-06-20 PROCEDURE — 99211 OFF/OP EST MAY X REQ PHY/QHP: CPT

## 2023-06-20 NOTE — PLAN OF CARE
Pt arrived with no dressing in place. WOUND CLEANSED WITH NS ,  WOUND COVERED WITH SMALL SILICONE FOAM BARRIER DRESSING, PATIENT TOLERATED WELL.

## 2023-06-21 ENCOUNTER — CLINICAL SUPPORT (OUTPATIENT)
Dept: INFUSION THERAPY | Facility: HOSPITAL | Age: 43
End: 2023-06-21
Attending: INTERNAL MEDICINE
Payer: MEDICAID

## 2023-06-21 VITALS
TEMPERATURE: 97 F | HEART RATE: 90 BPM | RESPIRATION RATE: 20 BRPM | OXYGEN SATURATION: 99 % | DIASTOLIC BLOOD PRESSURE: 81 MMHG | SYSTOLIC BLOOD PRESSURE: 133 MMHG

## 2023-06-21 PROCEDURE — 99211 OFF/OP EST MAY X REQ PHY/QHP: CPT

## 2023-06-21 NOTE — PLAN OF CARE
Pt here for wound care to perirectal wound. Tolerated well, see OP Wound flowsheet for charting. Image taken via [place]. Pt ambulated out of dept.

## 2023-06-23 ENCOUNTER — CLINICAL SUPPORT (OUTPATIENT)
Dept: INFUSION THERAPY | Facility: HOSPITAL | Age: 43
End: 2023-06-23
Attending: INTERNAL MEDICINE
Payer: MEDICAID

## 2023-06-23 VITALS
DIASTOLIC BLOOD PRESSURE: 88 MMHG | OXYGEN SATURATION: 99 % | RESPIRATION RATE: 20 BRPM | TEMPERATURE: 98 F | HEART RATE: 82 BPM | SYSTOLIC BLOOD PRESSURE: 137 MMHG

## 2023-06-23 PROCEDURE — 99211 OFF/OP EST MAY X REQ PHY/QHP: CPT

## 2023-06-23 NOTE — PLAN OF CARE
Pt here for wound care to perirectal area.  Pt arrived with no dressing in place, no drainage to site. Cleansed with normal saline. Applied foam barrier pad, tolerated well by patient.  Dr. Patton on unit, assessed wound. Dr. Medrano states pt no longer requiring wound care, follow up as need. Pt verbalized understanding.

## 2023-07-31 ENCOUNTER — HOSPITAL ENCOUNTER (EMERGENCY)
Facility: HOSPITAL | Age: 43
Discharge: HOME | End: 2023-07-31
Payer: MEDICAID

## 2023-07-31 VITALS
HEIGHT: 72 IN | WEIGHT: 210 LBS | HEART RATE: 109 BPM | RESPIRATION RATE: 18 BRPM | BODY MASS INDEX: 28.44 KG/M2 | DIASTOLIC BLOOD PRESSURE: 104 MMHG | TEMPERATURE: 99 F | OXYGEN SATURATION: 100 % | SYSTOLIC BLOOD PRESSURE: 166 MMHG

## 2023-07-31 DIAGNOSIS — S62.92XA CLOSED FRACTURE OF LEFT HAND, INITIAL ENCOUNTER: ICD-10-CM

## 2023-07-31 DIAGNOSIS — S62.339A CLOSED BOXER'S FRACTURE, INITIAL ENCOUNTER: Primary | ICD-10-CM

## 2023-07-31 PROCEDURE — 99283 EMERGENCY DEPT VISIT LOW MDM: CPT

## 2023-07-31 PROCEDURE — 29125 APPL SHORT ARM SPLINT STATIC: CPT | Mod: LT

## 2023-07-31 RX ORDER — HYDROCODONE BITARTRATE AND ACETAMINOPHEN 5; 325 MG/1; MG/1
1 TABLET ORAL EVERY 6 HOURS PRN
Qty: 12 TABLET | Refills: 0 | OUTPATIENT
Start: 2023-07-31 | End: 2023-08-12

## 2023-07-31 NOTE — ED PROVIDER NOTES
Encounter Date: 7/31/2023       History     Chief Complaint   Patient presents with    Hand Injury     Onset a few days ago.pt c/o pain to left hand. Pt punched wall. Swelling noted. 10/10 nothing otc for pain     42-year-old male presents complaining of left hand pain and swelling, since punching a wall 2 days ago.  He is ambidextrous.    The history is provided by the patient and a significant other.     Review of patient's allergies indicates:   Allergen Reactions    Bactrim [sulfamethoxazole-trimethoprim]      Past Medical History:   Diagnosis Date    ADHD      Past Surgical History:   Procedure Laterality Date    INCISION OF PERIRECTAL ABSCESS N/A 5/20/2023    Procedure: INCISION, ABSCESS, PERIRECTAL;  Surgeon: Jamaal Medrano MD;  Location: [facility] OR;  Service: General;  Laterality: N/A;    LEG SURGERY Right      History reviewed. No pertinent family history.  Social History     Tobacco Use    Smoking status: Every Day     Current packs/day: 1.00     Types: Cigarettes    Smokeless tobacco: Never   Substance Use Topics    Alcohol use: Not Currently    Drug use: Not Currently     Review of Systems   Constitutional:  Negative for fever.   HENT:  Negative for sore throat.    Respiratory:  Negative for shortness of breath.    Cardiovascular:  Negative for chest pain.   Gastrointestinal:  Negative for nausea.   Genitourinary:  Negative for dysuria.   Musculoskeletal:  Negative for back pain.        Left Hand pain and swelling   Skin:  Negative for rash.   Neurological:  Negative for weakness.   Hematological:  Does not bruise/bleed easily.   All other systems reviewed and are negative.      Physical Exam     Initial Vitals [07/31/23 0437]   BP Pulse Resp Temp SpO2   (!) 166/104 109 18 98.5 °F (36.9 °C) 100 %      MAP       --         Physical Exam    Nursing note and vitals reviewed.  Constitutional: Vital signs are normal. He appears well-developed and well-nourished. He is cooperative.   HENT:   Head:  Normocephalic and atraumatic.   Eyes: Conjunctivae, EOM and lids are normal. Pupils are equal, round, and reactive to light.   Neck: Trachea normal. Neck supple.   Normal range of motion.  Cardiovascular:  Normal rate, regular rhythm, normal heart sounds and intact distal pulses.           Pulmonary/Chest: Breath sounds normal.   Abdominal: Abdomen is soft. Bowel sounds are normal.   Musculoskeletal:      Cervical back: Normal, normal range of motion and neck supple.      Lumbar back: Normal.      Comments: Swelling and tenderness centered around left metacarpal phalangeal joint of the 5th finger     Neurological: He is alert and oriented to person, place, and time. He has normal strength. Coordination normal.   Skin: Skin is warm, dry and intact. Capillary refill takes less than 2 seconds.   Psychiatric: He has a normal mood and affect. His speech is normal and behavior is normal. Judgment and thought content normal. Cognition and memory are normal.         ED Course   Orthopedic Injury    Date/Time: 7/31/2023 4:30 AM    Performed by: Hugo Corea MD  Authorized by: Hugo Corea MD    Location procedure was performed:  [facility] EMERGENCY DEPARTMENT  Pre-operative diagnosis:  Left boxer fracture  Post-operative diagnosis:  Left boxer fracture  Consent Done?:  Not Needed  Injury:     Injury location:  Hand    Location details:  Left hand    Injury type:  Fracture    Fracture type: fifth metacarpal        Pre-procedure assessment:     Neurovascular status: Neurovascularly intact      Distal perfusion: normal      Neurological function: normal      Range of motion: normal      Local anesthesia used?: No      Patient sedated?: No        Selections made in this section will also lock the Injury type section above.:     Immobilization:  Splint    Splint type:  Ulnar gutter    Supplies used:  Cotton padding, elastic bandage and Ortho-Glass    Complications: No      Estimated blood loss (mL):  0    Specimens: No       Implants: No    Post-procedure assessment:     Neurovascular status: Neurovascularly intact      Distal perfusion: normal      Neurological function: normal      Range of motion: splinted      Patient tolerance:  Patient tolerated the procedure well with no immediate complications    Labs Reviewed - No data to display       Imaging Results              X-Ray Hand 3 view Left (Preliminary result)  Result time 07/31/23 05:12:09      Wet Read by Hugo Corea MD (07/31/23 05:12:09, [facility], Emergency Medicine)    Boxer fracture                                     Medications - No data to display  Medical Decision Making:   Initial Assessment:   Left boxer fracture  ED Management:  Splint                          Clinical Impression:   Final diagnoses:  [S62.339A] Closed boxer's fracture, initial encounter (Primary)  [S62.92XA] Closed fracture of left hand, initial encounter        ED Disposition Condition    Discharge Good          ED Prescriptions       Medication Sig Dispense Start Date End Date Auth. Provider    HYDROcodone-acetaminophen (NORCO) 5-325 mg per tablet Take 1 tablet by mouth every 6 (six) hours as needed for Pain. 12 tablet 7/31/2023 -- Hugo Corea MD          Follow-up Information       Follow up With Specialties Details Why Contact Info    PCP  Call today               Hugo Corea MD  07/31/23 1825

## 2023-07-31 NOTE — Clinical Note
"Omar"Donovan Rodriguez was seen and treated in our emergency department on 7/31/2023.  He may return to work on 08/01/2023.  Use of left hand for 2 weeks     If you have any questions or concerns, please don't hesitate to call.      Hugo Corea MD"

## 2023-08-12 ENCOUNTER — HOSPITAL ENCOUNTER (EMERGENCY)
Facility: HOSPITAL | Age: 43
Discharge: HOME | End: 2023-08-12
Attending: FAMILY MEDICINE
Payer: MEDICAID

## 2023-08-12 VITALS
HEIGHT: 72 IN | TEMPERATURE: 98 F | HEART RATE: 90 BPM | DIASTOLIC BLOOD PRESSURE: 76 MMHG | SYSTOLIC BLOOD PRESSURE: 133 MMHG | WEIGHT: 205 LBS | RESPIRATION RATE: 19 BRPM | BODY MASS INDEX: 27.77 KG/M2 | OXYGEN SATURATION: 96 %

## 2023-08-12 DIAGNOSIS — M25.519 SHOULDER PAIN: ICD-10-CM

## 2023-08-12 DIAGNOSIS — S40.211A: ICD-10-CM

## 2023-08-12 DIAGNOSIS — S42.021A CLOSED DISPLACED FRACTURE OF SHAFT OF RIGHT CLAVICLE, INITIAL ENCOUNTER: Primary | ICD-10-CM

## 2023-08-12 DIAGNOSIS — M89.8X1 PAIN OF RIGHT CLAVICLE: ICD-10-CM

## 2023-08-12 PROCEDURE — 99284 EMERGENCY DEPT VISIT MOD MDM: CPT

## 2023-08-12 PROCEDURE — 25000003 PHARM REV CODE 250: Performed by: NURSE PRACTITIONER

## 2023-08-12 RX ORDER — AMOXICILLIN AND CLAVULANATE POTASSIUM 875; 125 MG/1; MG/1
1 TABLET, FILM COATED ORAL EVERY 12 HOURS
Qty: 20 TABLET | Refills: 0 | Status: SHIPPED | OUTPATIENT
Start: 2023-08-12 | End: 2023-08-22

## 2023-08-12 RX ORDER — HYDROCODONE BITARTRATE AND ACETAMINOPHEN 5; 325 MG/1; MG/1
1 TABLET ORAL
Status: COMPLETED | OUTPATIENT
Start: 2023-08-12 | End: 2023-08-12

## 2023-08-12 RX ORDER — HYDROCODONE BITARTRATE AND ACETAMINOPHEN 7.5; 325 MG/1; MG/1
1 TABLET ORAL EVERY 8 HOURS PRN
Qty: 10 TABLET | Refills: 0 | Status: SHIPPED | OUTPATIENT
Start: 2023-08-12

## 2023-08-12 RX ADMIN — HYDROCODONE BITARTRATE AND ACETAMINOPHEN 1 TABLET: 5; 325 TABLET ORAL at 05:08
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
## (undated) DEVICE — DRAPE LEGGINGS CUFF 33X51IN

## (undated) DEVICE — BLADE SURG #15 CARBON STEEL

## (undated) DEVICE — NDL SAFETY STD LUER 22GX1.5IN

## (undated) DEVICE — SUT 3-0 VICRYL SH CR/8 18

## (undated) DEVICE — TRAY DRY SKIN SCRUB PREP

## (undated) DEVICE — Device

## (undated) DEVICE — SET SINGLE BASIN

## (undated) DEVICE — PACK BASIC

## (undated) DEVICE — SYR 10CC LUER LOCK

## (undated) DEVICE — DRAPE UND BUTT W/POUCH 40X44IN